# Patient Record
Sex: MALE | Race: WHITE | NOT HISPANIC OR LATINO | URBAN - METROPOLITAN AREA
[De-identification: names, ages, dates, MRNs, and addresses within clinical notes are randomized per-mention and may not be internally consistent; named-entity substitution may affect disease eponyms.]

---

## 2020-04-16 ENCOUNTER — TELEPHONE (OUTPATIENT)
Dept: TRANSPLANT | Facility: CLINIC | Age: 25
End: 2020-04-16

## 2020-04-16 DIAGNOSIS — Z00.5 TRANSPLANT DONOR EVALUATION: Primary | ICD-10-CM

## 2020-04-16 NOTE — TELEPHONE ENCOUNTER
NDD. Is abo O. Takes Anastrozole for low Testosterone.Per Dr Galdamez it's OK to cont testing. Will send info/forms. Once able,will sched labs at local clinic.

## 2020-04-16 NOTE — TELEPHONE ENCOUNTER
"[facility] BREEZE  [id number]      LIVING KIDNEY DONOR EVALUATION  Donor First Name Ari Donor MRN    Donor Last Name Leona Completed 4/10/2020 7:37 PM    1995 Record ID [id number]   BREEZE Screen PASSED     Intended Recipient  Recipient First Name ALTRUISTIC Recipient MRN    Recipient Last Name ALTRUISTIC Relationship N/A   Recipient   Recipient Diagnosis    Recipient's ABO      Donor Information  Age 24 Gender Male   Ht 188 cm (6' 2'') Race    Wt 81.6 kg (180 lbs) Ethnicity Not /   BMI 23.10 kg/m  Preferred Language English      Required No     Blood Type O   Demographics  Home Address [address] [place] [address] # [phone]   [facility] Type [facility] Alternate #    [facility] Code [id number] Type    Country [place] Preferred Contact day Mon, Fri, Thur, Wed, Tue   Email [email or web address] Preferred Contact time 09:00 AM-11:00 AM   &&   Donor's Medical Information  Medical History Panic d/o NOS   other (\"Low testosterone for age\")   other (\"Period of anxiety attacks which have resolved soon after starting the medication (~6 months ago)\") Medications Anastrozole   Escitalopram   Surgical History None Reported Allergies NKDA   Social History EtOH: Occasional (1-2 drinks/week)   Illicit Drug Use: Denies   Tobacco: Denies Self-Reported Functional Status \"I am able to participate in strenuous sports such as swimming, singles tennis, football, basketball, or skiing\"   Family Medical History Cancer (denies)   Diabetes (denies)   Heart Disease (denies)   Hypertension (denies)   Kidney Disease (denies)   Kidney Stones (denies) Exercise Frequency Exercise (>3 times per week)   Review of Organ Systems  Review of Systems Airway or Lungs: No   Blood Disorder: No   Cancer: No   Diabetes,Thyroid,Adrenal,Endocrine Disorder: No   Digestive or Liver: No   Heart or Circulatory System: No   Immune Diseases: No   Kidneys and Bladder: No   Male Health: No   Muscles,Bones,Joints: " No   Neuro: No   Psych: Yes   &&   Donor's Social Information  Marital Status Single Living Accommodation Lives in rented accommodation   Level of Education College or baccalaureate degree complete Living Arrangement With no relatives, residential arrangement   Employment Status Full Time Concerns: health and life insurance No   Employer [employer] Concerns: job security and lost income No   Occupation      Medical Insurance Status Has medical insurance     High Risk Behavior  High Risk Behaviors Blood transfusion < 12 months. (NO)   Commercial sex < 12 months. (NO)   Illicit IV drug use < 5yrs. (NO)   Male:male sexual contact < 5yrs. (NO)   Other high risk sexual contact < 12 months. (NO)   Reason for Donation  Referral Self Researched Reason for Donation I have two functional kidneys. I don't need both. A lot of times in life we can't help people. However, this is one way through which I can.   Permission to Disclose Inquiry Yes Patient Comments None at the moment. Thanks!   Donor Motivation Level Ready to start evaluation with reservations     PCP Contact  PCP Name Henok Alarcon   PCP [facility]   PCP Phone    Emergency Contact  First Name Adwoa First Name Izabela   Last Name Leona Last Name Leona   Phone # [phone] Phone # [phone]   Phone Type Mobile Phone Type Mobile   Relationship Mother Relationship Sister   Office Use  Reviewed By    Reviewed 4/16/2020 11:33 AM   Admin Folder Archive   Comments    Lost for Followup    Extended Comments    BREEZE ID [email or web address]:[id number] survey status completed   Activity History  Call  Task    Due Date 4/16/2020   Last Modified Date/Time 4/16/2020 11:10 AM   Comments    Call  Task    Due Date 4/14/2020   Last Modified Date/Time 4/14/2020 10:17 AM   Comments

## 2020-08-20 ENCOUNTER — ALLIED HEALTH/NURSE VISIT (OUTPATIENT)
Dept: TRANSPLANT | Facility: CLINIC | Age: 25
End: 2020-08-20
Attending: INTERNAL MEDICINE

## 2020-08-20 VITALS
HEART RATE: 67 BPM | OXYGEN SATURATION: 97 % | WEIGHT: 177.4 LBS | SYSTOLIC BLOOD PRESSURE: 123 MMHG | TEMPERATURE: 97.7 F | DIASTOLIC BLOOD PRESSURE: 80 MMHG | BODY MASS INDEX: 23.51 KG/M2 | HEIGHT: 73 IN

## 2020-08-20 DIAGNOSIS — Z00.5 TRANSPLANT DONOR EVALUATION: Primary | ICD-10-CM

## 2020-08-20 DIAGNOSIS — Z00.5 TRANSPLANT DONOR EVALUATION: ICD-10-CM

## 2020-08-20 LAB
ABO + RH BLD: NORMAL
ABO + RH BLD: NORMAL
ALBUMIN UR-MCNC: NEGATIVE MG/DL
APPEARANCE UR: CLEAR
BILIRUB UR QL STRIP: NEGATIVE
COLOR UR AUTO: YELLOW
CREAT SERPL-MCNC: 1.05 MG/DL (ref 0.66–1.25)
CREAT UR-MCNC: 156 MG/DL
GFR SERPL CREATININE-BSD FRML MDRD: >90 ML/MIN/{1.73_M2}
GLUCOSE SERPL-MCNC: 91 MG/DL (ref 70–99)
GLUCOSE UR STRIP-MCNC: NEGATIVE MG/DL
HGB BLD-MCNC: 14.9 G/DL (ref 13.3–17.7)
HGB UR QL STRIP: NEGATIVE
KETONES UR STRIP-MCNC: NEGATIVE MG/DL
LEUKOCYTE ESTERASE UR QL STRIP: NEGATIVE
MUCOUS THREADS #/AREA URNS LPF: PRESENT /LPF
NITRATE UR QL: NEGATIVE
PH UR STRIP: 6 PH (ref 5–7)
PROT UR-MCNC: 0.1 G/L
PROT/CREAT 24H UR: 0.06 G/G CR (ref 0–0.2)
RBC #/AREA URNS AUTO: <1 /HPF (ref 0–2)
SOURCE: ABNORMAL
SP GR UR STRIP: 1.02 (ref 1–1.03)
SPECIMEN EXP DATE BLD: NORMAL
UROBILINOGEN UR STRIP-MCNC: 0 MG/DL (ref 0–2)
WBC #/AREA URNS AUTO: <1 /HPF (ref 0–5)

## 2020-08-20 PROCEDURE — 81001 URINALYSIS AUTO W/SCOPE: CPT | Performed by: TRANSPLANT SURGERY

## 2020-08-20 PROCEDURE — 82947 ASSAY GLUCOSE BLOOD QUANT: CPT | Performed by: TRANSPLANT SURGERY

## 2020-08-20 PROCEDURE — 36415 COLL VENOUS BLD VENIPUNCTURE: CPT | Performed by: TRANSPLANT SURGERY

## 2020-08-20 PROCEDURE — 85018 HEMOGLOBIN: CPT | Performed by: TRANSPLANT SURGERY

## 2020-08-20 PROCEDURE — 84156 ASSAY OF PROTEIN URINE: CPT | Performed by: TRANSPLANT SURGERY

## 2020-08-20 PROCEDURE — 86900 BLOOD TYPING SEROLOGIC ABO: CPT | Performed by: TRANSPLANT SURGERY

## 2020-08-20 PROCEDURE — 82565 ASSAY OF CREATININE: CPT | Performed by: TRANSPLANT SURGERY

## 2020-08-20 ASSESSMENT — PAIN SCALES - GENERAL: PAINLEVEL: NO PAIN (0)

## 2020-08-20 ASSESSMENT — MIFFLIN-ST. JEOR: SCORE: 1857.52

## 2020-08-20 NOTE — NURSING NOTE
"Chief Complaint   Patient presents with     Allied Health Visit     3 BP's     Vital signs:  Temp: 97.7  F (36.5  C)   BP: 123/80 Pulse: 67     SpO2: 97 %     Height: 186.1 cm (6' 1.25\") Weight: 80.5 kg (177 lb 6.4 oz)  Estimated body mass index is 23.25 kg/m  as calculated from the following:    Height as of this encounter: 1.861 m (6' 1.25\").    Weight as of this encounter: 80.5 kg (177 lb 6.4 oz).        Jennifer Carr, ALISSA    "

## 2020-08-21 ENCOUNTER — TELEPHONE (OUTPATIENT)
Dept: TRANSPLANT | Facility: CLINIC | Age: 25
End: 2020-08-21

## 2020-08-21 DIAGNOSIS — Z00.5 TRANSPLANT DONOR EVALUATION: Primary | ICD-10-CM

## 2020-09-03 ENCOUNTER — TELEPHONE (OUTPATIENT)
Dept: TRANSPLANT | Facility: CLINIC | Age: 25
End: 2020-09-03

## 2020-09-03 NOTE — TELEPHONE ENCOUNTER
I left a voice mail message for Ari today asking if I could complete his kidney donor psychosocial evaluation today rather than tomorrow.  I will await a return call from him.    MARIA ELENA Ramsey, [facility]  Clinical  and Independent Donor Advocate  [facility]  Pager:  [phone]  Direct:  [phone]

## 2020-09-03 NOTE — PROGRESS NOTES
Psychosocial Evaluation  Living Organ Donation per OPTN Policy 14.1.A  Organ Type: kidney, non-directed donor  Presenting Information:  Mr. Ari Delgadillo presents to the [facility] to complete a psychosocial evaluation since she is interested in becoming a kidney donor for someone in need.    PERSONAL BACKGROUND:  Current Living Situation: Ari lives in a home that he rents in [place], [place].and lives with a roommate who is a close friend.    Education/Employment/Financial Situation: Ari completed high school in [place], [place].  He graduated from [employer] 1 year ago.  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  He states that he loves this job.  He has paid time off.  He denies any concerns about financial burden as a result of kidney donation.    Health Insurance Status: Ari states that he has health care insurance through his parents and can be on that until he is 28 years old.    Family History: Ari is  single, never , and has no children.  His parents live in [place], [place].  His sister and brother in law live in [place].   Has another sister who lives in [place], [place].      General Health: Ari reports that he is in excellent health.  He like to lift weights.    Mental Health: 5 mg Lexapro to manage anxiety.    Alcohol and Drug Use/Abuse/Dependency: Denies any past history of abuse or dependency on alcohol or illicit drugs. Denies any current use of street drugs, including marijuana.  Denies any past history of negative consequences of use of alcohol or drugs such as a DUI, relationship problems, or problems with work or home life. Ari reports that he consume approximately 3 servings of alcohol per week.      Cigarette Use: Ari smokes an occasional cigar.  He does not smoke cigarettes.      Legal: no legal issues were identified.    Coping with major surgery/associated stress: Ari likes to read and stay  active as ways to cope with stress.     Support System: Ari reports that his support system includes his roommate, his parents, and his two sisters.  His mother is very supportive of him being a kidney donor.  His father is not totally on board, but will support him nonetheless.      DONOR SPECIFIC INFORMATION:  Relationship to Recipient: not applicable.  Mr. Delgadillo wants to give his kidney to someone in need.    Decision Process/Motivation to Donate: Ari states that he feels strongly that helping others in ways that you can is a personal value.  He states that he has two functional kidneys and does not need both.  He says that a lot of times in life we can't help people.  However, this is one way that he can help.  Ari has a history of volunteering in high school for his [employer] community.  He says that this desire to donate a kidney is in keeping with his personal ethics.    PREPARATION FOR DONATION, RECOVERY, AND POTENTIAL SHORT-LONG-TERM OUTCOMES:  Understanding of the Living Donation Process:   We discussed the role of the donor  and Independent Donor Advocate.  Short and long term medical and psychosocial risks to both, donor and recipient were reviewed and he voices his understanding of these risks.  High risk behaviors as defined by [place] Public Health Services (PHS) 2013 that have potential to increase risk of disease transmission were reviewed and he denies any high risk behaviors. Post surgical restrictions (2 weeks no driving, 6 weeks no lifting over 10 lbs) were reviewed and he appears capable of adhering to the post surgical requirements. The need for a caregiver was discussed and he has his parents and roommate to provide care to him post surgery .  The risk of poor psychosocial outcome including problems with body image, post-surgery depression or anxiety, or feelings of emotional distress or bereavement if recipient experiences any recurrent disease, poor outcome or death was reviewed.   Additionally, potential financial implications, including the risk of having difficulty obtaining health care insurance, life insurance, disability insurance, or long term care insurance were reviewed, as were available donor grants to assist with donor related expenses.      We also discussed some unique issues that arise with paired kidney donation, which include the uncertainty of the timing and the importance of having a employment situation and support system that is able to provide sustained support and flexibility.    Ari appears capable of understanding this information and making an informed medical decision.    Impressions/Recommendations:   Mr. Ari Delgadillo  is highly motivated to donate kidney  to someone in need.  His decision to donate is free of inducement, coercion, or other undue pressure.   His housing, finances and employment are stable.  No current/active mental health or chemical abuse issues were identified.  The need for a caregiver was reviewed and he is able to identify a plan to meet his post operative care needs.  Mr. Delgadillo appears capable of making an informed medical decision.  No psychosocial contraindications to living organ donation were identified and  I support Mr. Delgadillo s desire to donate a kidney to someone in need.       Contact Information:   MARIA ELENA Ramsey, [facility]  Clinical  and Independent Donor Advocate  [facility]  Pager:  [phone]  Direct:  [phone]    Time Spent: 60 minutes      Living Kidney Donor Consent per OPTN Policy 14.3.A for Independent Living Donor Advocate (JANETH)    Written assurance has been obtained from the potential donor that he/she:   Is willing to donate  Is free from inducement and coercion  Has been informed that the he/she may decline to donate at any time  Has been informed that transplant centers must:   A) Offer donors an opportunity to discontinue the donor consent or evaluation  process in a way that is protected and confidential  B) Provide an independent living donor advocate (JANETH) to assist the potential donor during this process    The following was presented to the potential donor in a language in which the potential donor is able to engage in meaningful dialogue:   Education and instruction about all phases of the living donation process including:   Consent  Medical and psychosocial evaluation  Information about the surgical procedure  Pre and post operative care  Benefits of post operative follow up  Disclosure that the [facility] will take all reasonable precautions to provide confidentiality for the donor/recipient  Disclosure that it is a federal crime for any person to knowingly acquire, obtain or otherwise transfer any human organ for valuable consideration  Disclosure that the [facility] must provide an independent living donor advocate (JANETH)  Disclosure that health information obtained during the evaluation is subject to the same regulations as all records and could reveal conditions that must be reported to local, state, or federal public health authorities  Disclosure that the [facility] is required to report living donor follow up information at 6 months, 1 year, and 2 years, and that the potential donor must commit to post operative follow up testing coordinated by the [facility]    Disclosure has been provided that these risks may be transient or permanent & include but are not limited to:  Potential psychosocial risks:  Problems with body image  Post-surgery depression or anxiety  Feelings of emotional distress or bereavement if recipient experiences any recurrent disease or in the event of the recipient s death  Impact of donation on the donor s lifestyle    Potential financial impacts:  Personal expenses of travel, housing, , lost wages related to donation might not be reimbursed. However, resources may be available to defray  some donation-related expenses   Need for life-long follow up at the donor s expense  Loss of employment or income  Negative impact on the ability to obtain future employment  Negative impact on the ability to obtain, maintain, or afford health, disability, and life insurance  Future health problems experienced by living donors following donation may not be covered by the recipient s insurance    Contact Information:  MARIA ELENA Ramsey, [facility]  Clinical  and Independent Donor Advocate  [facility]  Pager:  [phone]  Direct:  [phone]    Time Spent: 60 minutes

## 2020-09-04 ENCOUNTER — VIRTUAL VISIT (OUTPATIENT)
Dept: TRANSPLANT | Facility: CLINIC | Age: 25
End: 2020-09-04
Attending: TRANSPLANT SURGERY

## 2020-09-04 ENCOUNTER — VIRTUAL VISIT (OUTPATIENT)
Dept: NEPHROLOGY | Facility: CLINIC | Age: 25
End: 2020-09-04
Attending: TRANSPLANT SURGERY

## 2020-09-04 DIAGNOSIS — F41.9 ANXIETY: ICD-10-CM

## 2020-09-04 DIAGNOSIS — Z00.5 EXAMINATION OF POTENTIAL DONOR OF ORGAN AND TISSUE: Primary | ICD-10-CM

## 2020-09-04 DIAGNOSIS — Z00.5 TRANSPLANT DONOR EVALUATION: Primary | ICD-10-CM

## 2020-09-04 DIAGNOSIS — Z00.5 TRANSPLANT DONOR EVALUATION: ICD-10-CM

## 2020-09-04 RX ORDER — ESCITALOPRAM OXALATE 5 MG/1
TABLET ORAL
COMMUNITY
Start: 2019-10-01 | End: 2020-12-01

## 2020-09-04 RX ORDER — LORATADINE 10 MG/1
TABLET ORAL DAILY PRN
COMMUNITY

## 2020-09-04 NOTE — PROGRESS NOTES
"Ari Delgadillo is a 24 year old male who is being evaluated via a billable telephone visit.      The patient has been notified of following:     \"This telephone visit will be conducted via a call between you and your physician/provider. We have found that certain health care needs can be provided without the need for a physical exam.  This service lets us provide the care you need with a short phone conversation.  If a prescription is necessary we can send it directly to your pharmacy.  If lab work is needed we can place an order for that and you can then stop by our lab to have the test done at a later time.    Telephone visits are billed at different rates depending on your insurance coverage. During this emergency period, for some insurers they may be billed the same as an in-person visit.  Please reach out to your insurance provider with any questions.    If during the course of the call the physician/provider feels a telephone visit is not appropriate, you will not be charged for this service.\"    Patient has given verbal consent for Telephone visit? yes    Phone call duration: 15 minutes    Outpatient MNT: Kidney Donor Evaluation    Current BMI: 23.3 (HT 73.25 in,  lbs/81 kg)- data from 8/20   BMI is within recommendation of <30 for kidney donation     Time Spent: 15 minutes  Visit Type: Initial   Referring Physician: Regan   Pt accompanied by: self    Nutrition Assessment  Vitamins, Supplements, Pertinent Meds: none   Herbal Medicines/Supplements: none     Diet Recall  Breakfast May have some fruit or waits until lunch    Lunch Skinny Bravo, Karla, or chicken/kale/quinoa, s/w, or PB english muffin at home    Dinner Similar to L    Snacks Chips, fruit, cereal    Beverages Coffee, water, la croix    Alcohol 3-4 drinks/week    Dining out 3x/week      Physical Activity  Lifts weights and plays tennis 6x/week      Anthropometrics  Height:   73.25 in   BMI:    23.3    Weight Status:Normal BMI   Weight:  177 " lbs (8/20)            IBW (lb): 186  % IBW: 95    Wt Hx: Pt has intentionally gained 5-10 lbs recently.     Adj/dosing BW: 177 lbs/81 kg       Labs  No results for input(s): CHOL, HDL, LDL, TRIG, CHOLHDLRATIO in the last 00732 hours.    FBG = 91 (8/20)  A1C = no recent level on file   BP = 123/80    Prediction of Incident Diabetes Mellitus in Middle-aged Adults: The [place] Offspring Study  Mauro Petersen MD; James B. Meigs, MD, MPH; Dolores Austin, PhD; Deepa Beasley MD, MPH; Forest Campos MD; Lance Gonzalez Sr,   PhD  Pt's estimated risk for T2DM (per Table 6 above)  Pt received points for the following criteria: unable to calculate d/t incomplete lab workup     Estimated Nutrition Needs  Energy  [id number]    (25-30 kcal/kg for maintenance)     Protein  65-81    (0.8-1 g/kg for maintenance)           Fluid  1 ml/kcal or per MD     Nutrition Diagnosis  Food and nutrition related knowledge deficit r/t pre transplant donor eval pt AEB pt verbalized not hearing pre/post transplant diet guidelines.    Nutrition Intervention  Nutrition education provided:  Reviewed overall healthy diet guidelines for pre and post kidney donation. Discussed maintenance of a healthy weight and Na+ intake <3000 mg/day (<2000 mg/day if HTN).    Avoid the following post op d/t unknown effects on the organs:  - Herbal, Chinese, holistic, chiropractic, natural, alternative medicines and supplements  - Detoxes and cleanses  - Weight loss pills  - Protein powders or other products with extracts or herbs (ie green tea extract)    Patient Understanding: Pt verbalized understanding of education provided.  Expected Compliance: Good  Follow-Up Plans: PRN     Nutrition Goals  Pt to verbalize understanding of education provided     Provided pt with contact info.   Shania Engle, RD, LD, CCTD  Pgr [phone]

## 2020-09-04 NOTE — LETTER
9/4/2020         RE: Ari Delgadillo  [address] [place] [address]        Dear Colleague,    Thank you for referring your patient, Ari Delgadillo, to the [facility] SOLID ORGAN TRANSPLANT. Please see a copy of my visit note below.    Psychosocial Evaluation  Living Organ Donation per OPTN Policy 14.1.A  Organ Type: kidney, non-directed donor  Presenting Information:  Mr. Ari Delgadillo presents to the [facility] to complete a psychosocial evaluation since she is interested in becoming a kidney donor for someone in need.    PERSONAL BACKGROUND:  Current Living Situation: Ari lives in a home that he rents in [place], [place].and lives with a roommate who is a close friend.    Education/Employment/Financial Situation: Ari completed high school in [place], [place].  He graduated from [employer] 1 year ago.  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  He states that he loves this job.  He has paid time off.  He denies any concerns about financial burden as a result of kidney donation.    Health Insurance Status: Ari states that he has health care insurance through his parents and can be on that until he is 28 years old.    Family History: Ari is  single, never , and has no children.  His parents live in [place], [place].  His sister and brother in law live in [place].   Has another sister who lives in [place], [place].      General Health: Ari reports that he is in excellent health.  He like to lift weights.    Mental Health: 5 mg Lexapro to manage anxiety.    Alcohol and Drug Use/Abuse/Dependency: Denies any past history of abuse or dependency on alcohol or illicit drugs. Denies any current use of street drugs, including marijuana.  Denies any past history of negative consequences of use of alcohol or drugs such as a DUI, relationship problems, or problems with work or home life. Ari reports that he consume approximately 3  servings of alcohol per week.      Cigarette Use: Ari smokes an occasional cigar.  He does not smoke cigarettes.      Legal: no legal issues were identified.    Coping with major surgery/associated stress: Ari likes to read and stay active as ways to cope with stress.     Support System: Ari reports that his support system includes his roommate, his parents, and his two sisters.  His mother is very supportive of him being a kidney donor.  His father is not totally on board, but will support him nonetheless.      DONOR SPECIFIC INFORMATION:  Relationship to Recipient: not applicable.  Mr. Delgadillo wants to give his kidney to someone in need.    Decision Process/Motivation to Donate: Ari states that he feels strongly that helping others in ways that you can is a personal value.  He states that he has two functional kidneys and does not need both.  He says that a lot of times in life we can't help people.  However, this is one way that he can help.  Ari has a history of volunteering in high school for his [employer] community.  He says that this desire to donate a kidney is in keeping with his personal ethics.    PREPARATION FOR DONATION, RECOVERY, AND POTENTIAL SHORT-LONG-TERM OUTCOMES:  Understanding of the Living Donation Process:   We discussed the role of the donor  and Independent Donor Advocate.  Short and long term medical and psychosocial risks to both, donor and recipient were reviewed and he voices his understanding of these risks.  High risk behaviors as defined by [place] Public Health Services (PHS) 2013 that have potential to increase risk of disease transmission were reviewed and he denies any high risk behaviors. Post surgical restrictions (2 weeks no driving, 6 weeks no lifting over 10 lbs) were reviewed and he appears capable of adhering to the post surgical requirements. The need for a caregiver was discussed and he has his parents and roommate to provide care to him post surgery .  The risk  of poor psychosocial outcome including problems with body image, post-surgery depression or anxiety, or feelings of emotional distress or bereavement if recipient experiences any recurrent disease, poor outcome or death was reviewed.  Additionally, potential financial implications, including the risk of having difficulty obtaining health care insurance, life insurance, disability insurance, or long term care insurance were reviewed, as were available donor grants to assist with donor related expenses.      We also discussed some unique issues that arise with paired kidney donation, which include the uncertainty of the timing and the importance of having a employment situation and support system that is able to provide sustained support and flexibility.    Ari appears capable of understanding this information and making an informed medical decision.    Impressions/Recommendations:   Mr. Ari Delgadillo  is highly motivated to donate kidney  to someone in need.  His decision to donate is free of inducement, coercion, or other undue pressure.   His housing, finances and employment are stable.  No current/active mental health or chemical abuse issues were identified.  The need for a caregiver was reviewed and he is able to identify a plan to meet his post operative care needs.  Mr. Delgadillo appears capable of making an informed medical decision.  No psychosocial contraindications to living organ donation were identified and  I support Mr. Delgadillo s desire to donate a kidney to someone in need.       Contact Information:   MARIA ELENA Ramsey, [facility]  Clinical  and Independent Donor Advocate  [facility]  Pager:  [phone]  Direct:  [phone]    Time Spent: 60 minutes      Living Kidney Donor Consent per OPTN Policy 14.3.A for Independent Living Donor Advocate (JANETH)    Written assurance has been obtained from the potential donor that he/she:   Is willing to  donate  Is free from inducement and coercion  Has been informed that the he/she may decline to donate at any time  Has been informed that transplant centers must:   A) Offer donors an opportunity to discontinue the donor consent or evaluation process in a way that is protected and confidential  B) Provide an independent living donor advocate (JANETH) to assist the potential donor during this process    The following was presented to the potential donor in a language in which the potential donor is able to engage in meaningful dialogue:   Education and instruction about all phases of the living donation process including:   Consent  Medical and psychosocial evaluation  Information about the surgical procedure  Pre and post operative care  Benefits of post operative follow up  Disclosure that the [facility] will take all reasonable precautions to provide confidentiality for the donor/recipient  Disclosure that it is a federal crime for any person to knowingly acquire, obtain or otherwise transfer any human organ for valuable consideration  Disclosure that the [facility] must provide an independent living donor advocate (JANETH)  Disclosure that health information obtained during the evaluation is subject to the same regulations as all records and could reveal conditions that must be reported to local, state, or federal public health authorities  Disclosure that the [facility] is required to report living donor follow up information at 6 months, 1 year, and 2 years, and that the potential donor must commit to post operative follow up testing coordinated by the [facility]    Disclosure has been provided that these risks may be transient or permanent & include but are not limited to:  Potential psychosocial risks:  Problems with body image  Post-surgery depression or anxiety  Feelings of emotional distress or bereavement if recipient experiences any recurrent disease or in the event of the  recipient s death  Impact of donation on the donor s lifestyle    Potential financial impacts:  Personal expenses of travel, housing, , lost wages related to donation might not be reimbursed. However, resources may be available to defray some donation-related expenses   Need for life-long follow up at the donor s expense  Loss of employment or income  Negative impact on the ability to obtain future employment  Negative impact on the ability to obtain, maintain, or afford health, disability, and life insurance  Future health problems experienced by living donors following donation may not be covered by the recipient s insurance    Contact Information:  MARIA ELENA Ramsey, [facility]  Clinical  and Independent Donor Advocate  [facility]  Pager:  [phone]  Direct:  [phone]    Time Spent: 60 minutes      Again, thank you for allowing me to participate in the care of your patient.        Sincerely,        KELSY Garcia

## 2020-09-04 NOTE — LETTER
"    9/4/2020         RE: Ari Delgadillo  [address] [place] [address]        Dear Colleague,    Thank you for referring your patient, Ari Delgadillo, to the [facility] SOLID ORGAN TRANSPLANT. Please see a copy of my visit note below.    Ari Delgadillo is a 24 year old male who is being evaluated via a billable telephone visit.      The patient has been notified of following:     \"This telephone visit will be conducted via a call between you and your physician/provider. We have found that certain health care needs can be provided without the need for a physical exam.  This service lets us provide the care you need with a short phone conversation.  If a prescription is necessary we can send it directly to your pharmacy.  If lab work is needed we can place an order for that and you can then stop by our lab to have the test done at a later time.    Telephone visits are billed at different rates depending on your insurance coverage. During this emergency period, for some insurers they may be billed the same as an in-person visit.  Please reach out to your insurance provider with any questions.    If during the course of the call the physician/provider feels a telephone visit is not appropriate, you will not be charged for this service.\"    Patient has given verbal consent for Telephone visit? yes    Phone call duration: 15 minutes    Outpatient MNT: Kidney Donor Evaluation    Current BMI: 23.3 (HT 73.25 in,  lbs/81 kg)- data from 8/20   BMI is within recommendation of <30 for kidney donation     Time Spent: 15 minutes  Visit Type: Initial   Referring Physician: Regan   Pt accompanied by: self    Nutrition Assessment  Vitamins, Supplements, Pertinent Meds: none   Herbal Medicines/Supplements: none     Diet Recall  Breakfast May have some fruit or waits until lunch    Lunch Skinny Bravo, Chipotle, or chicken/kale/quinoa, s/w, or PB english muffin at home    Dinner Similar to L    Snacks Chips, fruit, cereal    Beverages " Coffee, water, la croix    Alcohol 3-4 drinks/week    Dining out 3x/week      Physical Activity  Lifts weights and plays tennis 6x/week      Anthropometrics  Height:   73.25 in   BMI:    23.3    Weight Status:Normal BMI   Weight:  177 lbs (8/20)            IBW (lb): 186  % IBW: 95    Wt Hx: Pt has intentionally gained 5-10 lbs recently.     Adj/dosing BW: 177 lbs/81 kg       Labs  No results for input(s): CHOL, HDL, LDL, TRIG, CHOLHDLRATIO in the last 70961 hours.    FBG = 91 (8/20)  A1C = no recent level on file   BP = 123/80    Prediction of Incident Diabetes Mellitus in Middle-aged Adults: The [place] Offspring Study  Mauro Petersen MD; James B. Meigs, MD, MPH; Dolores Austin, PhD; Deepa Beasley MD, MPH; Forest Campos MD; Lance Gonzalez Sr,   PhD  Pt's estimated risk for T2DM (per Table 6 above)  Pt received points for the following criteria: unable to calculate d/t incomplete lab workup     Estimated Nutrition Needs  Energy  6069-3804    (25-30 kcal/kg for maintenance)     Protein  65-81    (0.8-1 g/kg for maintenance)           Fluid  1 ml/kcal or per MD     Nutrition Diagnosis  Food and nutrition related knowledge deficit r/t pre transplant donor eval pt AEB pt verbalized not hearing pre/post transplant diet guidelines.    Nutrition Intervention  Nutrition education provided:  Reviewed overall healthy diet guidelines for pre and post kidney donation. Discussed maintenance of a healthy weight and Na+ intake <3000 mg/day (<2000 mg/day if HTN).    Avoid the following post op d/t unknown effects on the organs:  - Herbal, Chinese, holistic, chiropractic, natural, alternative medicines and supplements  - Detoxes and cleanses  - Weight loss pills  - Protein powders or other products with extracts or herbs (ie green tea extract)    Patient Understanding: Pt verbalized understanding of education provided.  Expected Compliance: Good  Follow-Up Plans: PRN     Nutrition Goals  Pt to verbalize  understanding of education provided     Provided pt with contact info.   Shania Engle RD, LD, CCTD  Pgr [phone]                                          Again, thank you for allowing me to participate in the care of your patient.        Sincerely,        Shania Engle RD

## 2020-09-04 NOTE — LETTER
Date:September 9, 2020      Patient was self referred, no letter generated. Do not send.        [facility] Health Information

## 2020-09-04 NOTE — PROGRESS NOTES
I called today to provide education on living kidney donation.  The format for our conversation was talking on the phone and looking simultaneously at the slides for the Living Kidney Donor Informed Consent and SRTR form.  The patient has offered to be a nondirect donor.      Discussed surgery hospitalization and recovery.    Knows when and how to obtain evaluation results and the process for scheduling surgery.  Has received and reviewed the donor education materials including the Informed Consent for Living Kidney Donation.  The patient will sign the consent for donor evaluation and send it back to the Transplant Office.  Reviewed SRTR Data sheet.  Donor was Provided Living Donor Collective (LDC) Materials? Yes  Donor has agreed to be contacted by SRTR for follow-up LDC Questions? Yes  Reviewed that our center is participating in the Living Donor Collective to study the long-term outcomes of living organ donation.  Patient was born in the [place]  The patient states is a Non-Smoker.  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  He stated workplace is supportive as long as he plans things around the projects.  Timing for his donation would be after hunting season.  Late November/December to the Spring.      Nondirected donor Program:  I reviewed the possible ways that a NonDirect Donor can give a kidney.  One being running a matchrun after setting a date and the kidney would be allocated to the person at the top of the list.  I also reviewed that details pertaining to the Paired Exchange program National Kidney Registry.                         Patient has been sent consent forms for the KPD program via [employer].  [facility] Logistics:    I reviewed that in KPD you are not able to choose your match, but they may decline a match.  I showed possible scenarios that can happen when a chain is put together where there is a possibility of helping more than one  candidate receive a kidney.     I reviewed that the timeline for listing and finding a match is unknown, there may be significant waiting time to find a matched based on blood type.     I described the timing of a typical KPD event, where the donor cases go early in the morning and then the kidneys are shipped to the recipient site.     That the donor s kidney could be lost in transport, and other potentially negative consequences related to shipping a kidney.  Insurance:   I also reviewed that in D program the insurance of the matched recipient will be paying the donation costs.  I also reviewed:              1. The possibility that the matched candidate s insurance might not cover travel costs if the paired donor travels to the matched recipient transplant hospital.              2. The possibility of the paired donor s name appearing on the matched candidate s insurance estimation of benefits.    [facility] Blood Draw and Testing requirements:   I reviewed that frequent lab draws are necessary in the D program.  I explained that [facility] will send a kit when we initiate listing so that blood can be frozen to be used for exploratory crossmatches.  I stated that there is also additional testing needed when a match offer is given for confirmatory compatibility testing and infectious disease testing.  I reviewed that the donor's evaluation is good for one year.  If we are waiting after one year, then labs will need to be done to update the evaluation.    Correspondence with Matched Recipient:  I reviewed that it is okay to send a card to the recipient, that we ask that No identifiers be on the correspondence.  I stated that if all parties are willing, the Transplant office can facilitate meetings between matched donors and recipients.  I reviewed that the patient has the right to withdraw from participation in the KPD program at any time, for any reason.     Living Kidney Donor Informed Consent per OPTN Policy for Transplant  Coordinators:  I provided to the patient education and instruction about all phases of the living donation process that included:    A copy of the entire consent with SRTR outcomes.    Information regarding the financial impact of donation Results of medical and psychosocial evaluations.    Information stating that the [facility] may refuse you as a donor,  however, you could be evaluated by another transplant program with different selection criteria.    Instructions for pre- and post-operative care that state as a potential donor, you must commit to postoperative follow-up testing at 6 weeks, 6 months, 1 year, and 2 years.    Information was presented in a language in which the patient was able to engage in meaningful dialogue by use of an  if needed.    It is a federal crime for any person to knowingly acquire, obtain or otherwise transfer any human organ for valuable consideration.    [facility] takes all reasonable precautions to provide confidentiality for you, and the recipient. However, all health information obtained during the evaluation is subject to the same regulations as all records and could reveal conditions that must be reported to local, state, or federal public health authorities.    Disclosure of alternative procedures or courses of treatment for the recipient, including  donor transplant.    A  donor kidney may become available for the recipient before your donor evaluation is complete or transplant occurs.    Recipients are determined to be transplant candidates based on specific guidelines and clinical  judgement.    The recipient may have risk factors for increased morbidity or mortality that are above local or national averages do not prohibit transplantation and are not disclosed to you.    Rochelle Long RN  Living Donor Coordinator  2020 3:19 PM

## 2020-09-04 NOTE — PROGRESS NOTES
"Ari Delgadillo is a 24 year old male who is being evaluated via a billable video visit.      The patient has been notified of following:     \"This video visit will be conducted via a call between you and your physician/provider. We have found that certain health care needs can be provided without the need for an in-person physical exam.  This service lets us provide the care you need with a video conversation.  If a prescription is necessary we can send it directly to your pharmacy.  If lab work is needed we can place an order for that and you can then stop by our lab to have the test done at a later time.    Video visits are billed at different rates depending on your insurance coverage.  Please reach out to your insurance provider with any questions.    If during the course of the call the physician/provider feels a video visit is not appropriate, you will not be charged for this service.\"    Patient has given verbal consent for Video visit? Yes  How would you like to obtain your AVS? MyChart  If you are dropped from the video visit, the video invite should be resent to: Text to cell phone: [phone]  Will anyone else be joining your video visit? No        Video-Visit Details    Type of service:  Video Visit    Video Start Time: 9:35am  Video End Time: 10:05am    Originating Location (pt. Location): home    Distant Location (provider location):   [employer] [facility] [employer] [facility]     Platform used for Video Visit: [employer]    RE: Ari Delgadillo  [facility] #[id number]             I saw your patient, Ari Delgadillo, in consultation in our pretransplant clinic.  He presented via video visit for evaluation as a possible kidney donor. We discussed:    (1) The risks of donation--including mortality (0.03% risk) and morbidity (approximately 1% risk of major morbidity).  We discussed the major reported causes of death after kidney donation (bleeding, infection, pulmonary embolism).  We discussed the potential complications, including:  " bleeding requiring reoperation, infection requiring reoperation, pneumonia, bowel obstruction, infection at the site of the incision, hernia at the site of the incision, deep vein thrombosis, deep vein thrombosis with associated pulmonary embolism, and urinary tract infection.  I told him I could not possibly name all of the risks, but that he was at risk for any complications that could occur in any operation (and that a local library would have books/resources that could provide more detail).       (2) We also discussed the long-term risks of living with one kidney.  We discussed the rare diseases that might affect only one kidney.  We talked about the new publications suggesting slightly increased long-term risk of ESRD after donor nephrectomy.  For people his age, there is really no data to show what the outcome will be at 50-60 years.  This is an important consideration.  We discussed the rare diseases that might affect having only one kidney.    (3) The hospital stay and the fact that if all goes well, discharge would occur within two to three days after donor nephrectomy.  We also discussed the fact that there would be no diet or fluid restrictions (again if all went well), and that the only new medication that he would be on would be pain medication.  We discussed the fact that most patients are on Tylenol or something similar within five to six days of surgery.      (4) The recovery time after surgery and the restrictions that are necessary: a) no driving for a couple of weeks (or until he felt that his/her reaction would be adequate if he had to slam on the brakes; and b) no lifting over 10 pounds or any exercise that would stretch his abdominal muscles for six weeks (to allow the muscles to heal so that he doesn't develop a hernia).  We also discussed return to work, which could occur in approximately three to four weeks if he had a desk job or would require not returning to work for at least six weeks if  "the job required any heavy lifting or exercise.  We discussed the potential for not getting his pep and energy back for anywhere from two to six weeks after surgery and how there was a tremendous individual variation in return of full energy level.  I discussed our donor follow-up studies; and that in our surveys, 90% of donors had their energy back by 6 weeks postdonation.    (5) The concern about long distance travel for the first couple of weeks post-donation.  We discussed the risks of deep vein thrombosis associated with plane or car travel.  I recommended that, if flying, he should get up and walk around every 30-40 minutes; if driving he should ask the  to stop the car every 30-45 minutes so Mr. Delgadillo could take a short walk.    (6) The fact that the recipient could be on a waiting for  donor transplant and would ultimately receive a kidney if Mr. Delgadillo did not donate.      (7) The difference between nondirected and directed donors.  I explained our policy of allocation of nondirected kidneys to the number one person on the list.  I also explained that by being a nondirected donor, they would not have the opportunity to meet the recipient (at least for the first six months) and, therefore, would not have the opportunity to see the potential \"benefit\" of the donation.  I also mentioned that our recipients often have not sent \"thank-you\" letters to their nondirected donors.  This would be an important thing to recognize as he went forward in thinking about nondirected donation.  Finally, we discussed the option of entering the paired exchange pool--its advantage, increasing the number of transplants from the donation; the disadvantage, harder to schedule and more logistics.    Finally, he has not yet told all of his family members about his interest in being a nondirected donor.  His mother and some friends know, but his father and sisters do not.  He plans to discuss with them if he is " approved.    I attempted to answer any remaining questions.  I also told him that should he have any questions, he should feel free to contact us.  We would be glad to answer any questions either over the phone or at another clinic visit.  His transplant coordinator is Rochelle Long and may be reached at [phone].  Thank you for the opportunity to see him.    I spent 30 minutes with this patient.  Over 95% of that time was spent in counseling and coordination of care.    As an aside, we have recently developed a risk calculator based on our long-term donor follow-up.  Our goal was to provide information to donors, donor candidates and the medical community. The calculator, available at the web site below (it loads slowly) provides a risk estimate over 40 years postdonation of developing diabetes, hypertension, proteinuria, reduced eGFR and ESRD.   We hope you find it useful,  (And please let us know if you have difficulties using it or have suggestions for improvement.)  [email or web address]           Yours truly,               Rory Spears MD         Professor of Surgery         ([phone])      RICKEY/

## 2020-09-04 NOTE — LETTER
"9/4/2020       RE: Ari Delgadillo  [address] [place] [address]     Dear Colleague,    Thank you for referring your patient, Ari Delgadillo, to the [facility] NEPHROLOGY at [facility]. Please see a copy of my visit note below.    Ari Delgadillo is a 24 year old male who is being evaluated via a billable video visit.      The patient has been notified of following:     \"This video visit will be conducted via a call between you and your physician/provider. We have found that certain health care needs can be provided without the need for an in-person physical exam.  This service lets us provide the care you need with a video conversation.  If a prescription is necessary we can send it directly to your pharmacy.  If lab work is needed we can place an order for that and you can then stop by our lab to have the test done at a later time.    Video visits are billed at different rates depending on your insurance coverage.  Please reach out to your insurance provider with any questions.    If during the course of the call the physician/provider feels a video visit is not appropriate, you will not be charged for this service.\"    Patient has given verbal consent for Video visit? Yes  How would you like to obtain your AVS? MyChart  If you are dropped from the video visit, the video invite should be resent to: Text to cell phone: MY CHART  Will anyone else be joining your video visit? No    Video-Visit Details    Type of service:  Video Visit    Video Start Time: 0847  Video End Time: 0910    Originating Location (pt. Location): Home    Distant Location (provider location):  [facility] NEPHROLOGY     Platform used for Video Visit: Rosamaria Garibay MD      TRANSPLANT NEPHROLOGY DONOR EVALUATION    Assessment and Plan:  # Prospective Kidney Transplant Donor: Patient with one issue that need to be addressed prior to donation. Patient's blood pressure is acceptable at this visit, " kidney function appears to be acceptable with Iohexol pending, and urinalysis is bland.    # Anxiety/Panic Attack: Patient reports no panic disorder issues for over 9 months and his anxiety is well managed.  He appears to be doing well.  Appreciate  input.    Discussed the risks of donating a kidney, including the surgical risk and the possible risks of living with one kidney.    Education about expected post-donation kidney function and how chronic kidney disease (CKD) and end stage kidney disease (ESKD) might potentially impact the donor in the future, include, but not limited to:       - On average, donors will have 25-35% permanent loss of kidney function at donation.       - Baseline risk of ESKD may slightly exceed that of members of the general population with the same demographic profile.       - Donor risks must be interpreted in light of known epidemiology of both CKD or ESKD, such as that CKD generally develops in midlife (40-50 years old) and ESKD generally develops after age 60.       - Medical evaluation of young potential donors cannot predict lifetime risk of CKD or ESKD.       - Donors may be at higher risk for CKD if they sustain damage to the remaining kidney.       - Development of CKD and progression of ESKD may be more rapid with only 1 kidney.       - Some type of kidney replacement therapy, either kidney transplant or dialysis, is required when reaching ESKD.    Potential medical or surgical risks include, but not limited to:       - Death.       - Scars, pain, fatigue, and other consequences typical of any surgical procedure.       - Decreased kidney function.       - Abdominal or bowel symptoms, such as bloating and nausea, and developing bowel obstruction.       - Kidney failure (ESKD) and the need for a kidney transplant or dialysis for the donor.       - Impact of obesity, hypertension, or other donor-specific medical conditions on morbidity and mortality of the potential  "donor.    Patients overall evaluation will be discussed with the transplant team and a final recommendation on the patients' suitability to be a kidney transplant donor will be made at that time.    Consult:  Ari Delgadillo was seen in consultation at the request of Dr. Raysa Nguyen for evaluation as a potential kidney transplant donor.    Reason for Visit:  Ari Delgadillo is a 24 year old male who presents for a kidney donor evaluation.  Patient would like to be a non-directed donor.    HPI:    Patient reports feeling good overall with minimal medical complaints.  Patient is interested in donating a kidney because he feels other \"people can benefit.\"  He did have some issues with a panic attack and anxiety previously, but with medications, reports no panic attack issues for over 9 months.  He feels he manages his anxiety well with medication and exercise.    His energy level is good and remains normal.  He is active and gets regular exercise.  Denies any chest pain or shortness of breath with exertion.  Appetite is good and weight is unchanged.  No nausea, vomiting or diarrhea.  No fever, sweats or chills.  No leg swelling.         Kidney Disease Hx:       h/o Kidney Problems: No  Family h/o Genetic Kidney Disease: No       h/o HTN: No    Usual BP: 120/80s       h/o Protein in Urine: No  h/o Blood in Urine: No       h/o Kidney Stones: No  h/o Kidney Injury: No       h/o Recurrent UTI: No  h/o Genitourinary Problems: No       h/o Chronic NSAID Use: No         Other Medical Hx:       h/o DM: No             h/o Gastrointestinal, Pancreas or Liver Problems: No       h/o Lung or Heart Problems: No       h/o Hematologic Problems: No  h/o Bleeding or Clotting Problems: No       h/o Cancer: No       h/o Infection Problems: No         Skin Cancer Risk:       h/o more than 50 moles: No       h/o extensive sun exposure: No       h/o melanoma: No       Family h/o melanoma: No         Mental Health Assessment:       h/o " Depression: No       h/o Psychiatric Illness: Yes: Anxiety and panic attack       h/o Suicidal Attempt(s): No    Review Of Systems:   A comprehensive review of systems was obtained and negative, except as noted in the HPI or PMH.    Past Medical History:   History was taken from the patient as noted below.  Past Medical History:   Diagnosis Date     Anxiety      Male hypogonadism      Panic disorder        Past Social History:   Past Surgical History:   Procedure Laterality Date     NO HISTORY OF SURGERY       Personal or family history of anesthesia problems: Does not know: Never had surgery    Family History:   Family History   Problem Relation Age of Onset     Other - See Comments Father         Hypogammaglobulinemia; Receives monthly IVIg     Hypertension Maternal Grandmother      Hypertension Maternal Uncle           Specific Family History:       FH of DM: No       FH of CAD: No  FH of HTN: No       FH of Cancer: No  FH of Kidney Cancer: No    Personal History:   Social History     Socioeconomic History     Marital status: Single     Spouse name: Not on file     Number of children: 0     Years of education: Not on file     Highest education level: Not on file   Occupational History     Not on file   Social Needs     Financial resource strain: Not on file     Food insecurity     Worry: Not on file     Inability: Not on file     Transportation needs     Medical: Not on file     Non-medical: Not on file   Tobacco Use     Smoking status: Current Some Day Smoker     Types: Cigars     Smokeless tobacco: Never Used     Tobacco comment: Rare cigar use   Substance and Sexual Activity     Alcohol use: Yes     Alcohol/week: 3.0 - 4.0 standard drinks     Types: 3 - 4 Standard drinks or equivalent per week     Drug use: Never     Sexual activity: Not on file   Lifestyle     Physical activity     Days per week: Not on file     Minutes per session: Not on file     Stress: Not on file   Relationships     Social connections     " Talks on phone: Not on file     Gets together: Not on file     Attends Hindu service: Not on file     Active member of club or organization: Not on file     Attends meetings of clubs or organizations: Not on file     Relationship status: Not on file     Intimate partner violence     Fear of current or ex partner: Not on file     Emotionally abused: Not on file     Physically abused: Not on file     Forced sexual activity: Not on file   Other Topics Concern     Not on file   Social History Narrative     Not on file          Specific Social History:       Health Insurance Status: Yes       Employment Status: Full time  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 Authorizing Provider   escitalopram (LEXAPRO) 5 MG tablet  10/1/19  Yes Reported, Patient   loratadine (CLARITIN) 10 MG tablet As needed for allergies   Yes Reported, Patient       Vitals:  Vital Signs 8/20/2020 8/20/2020 8/20/2020   Systolic 126 113 123   Diastolic 77 72 80   Pulse 71 67 67   Temperature 97.7 - -   Weight (LB) 177 lb 6.4 oz - -   Height 6' 1.25\" - -   BMI (Calculated) 23.25 - -   Pain - 0 -   O2 97 97 97       Exam:   GENERAL APPEARANCE: alert and no distress  HENT: no obvious abnormalities on appearance  RESP: breathing appears unremarkable with normal rate, no audible wheezing or cough and no apparent shortness of breath with conversation  MS: extremities normal - no gross deformities noted  SKIN: no apparent rash and normal skin tone  NEURO: speech is clear with no obvious neurological deficits  PSYCH: mentation appears normal and affect normal    Results:   Labs and imaging were ordered for this visit and reviewed by " me.    Again, thank you for allowing me to participate in the care of your patient.      Sincerely,    Yovani Garibay MD

## 2020-09-04 NOTE — PROGRESS NOTES
"Ari Delgadillo is a 24 year old male who is being evaluated via a billable video visit.      The patient has been notified of following:     \"This video visit will be conducted via a call between you and your physician/provider. We have found that certain health care needs can be provided without the need for an in-person physical exam.  This service lets us provide the care you need with a video conversation.  If a prescription is necessary we can send it directly to your pharmacy.  If lab work is needed we can place an order for that and you can then stop by our lab to have the test done at a later time.    Video visits are billed at different rates depending on your insurance coverage.  Please reach out to your insurance provider with any questions.    If during the course of the call the physician/provider feels a video visit is not appropriate, you will not be charged for this service.\"    Patient has given verbal consent for Video visit? Yes  How would you like to obtain your AVS? MyChart  If you are dropped from the video visit, the video invite should be resent to: Text to cell phone: MY CHART  Will anyone else be joining your video visit? No    Video-Visit Details    Type of service:  Video Visit    Video Start Time: 0847  Video End Time: 0910    Originating Location (pt. Location): Home    Distant Location (provider location):  [facility] NEPHROLOGY     Platform used for Video Visit: Tyler Garibay MD      TRANSPLANT NEPHROLOGY DONOR EVALUATION    Assessment and Plan:  # Prospective Kidney Transplant Donor: Patient with one issue that need to be addressed prior to donation. Patient's blood pressure is acceptable at this visit, kidney function appears to be acceptable with Iohexol pending, and urinalysis is bland.    # Anxiety/Panic Attack: Patient reports no panic disorder issues for over 9 months and his anxiety is well managed.  He appears to be doing well.  Appreciate  " input.    Discussed the risks of donating a kidney, including the surgical risk and the possible risks of living with one kidney.    Education about expected post-donation kidney function and how chronic kidney disease (CKD) and end stage kidney disease (ESKD) might potentially impact the donor in the future, include, but not limited to:       - On average, donors will have 25-35% permanent loss of kidney function at donation.       - Baseline risk of ESKD may slightly exceed that of members of the general population with the same demographic profile.       - Donor risks must be interpreted in light of known epidemiology of both CKD or ESKD, such as that CKD generally develops in midlife (40-50 years old) and ESKD generally develops after age 60.       - Medical evaluation of young potential donors cannot predict lifetime risk of CKD or ESKD.       - Donors may be at higher risk for CKD if they sustain damage to the remaining kidney.       - Development of CKD and progression of ESKD may be more rapid with only 1 kidney.       - Some type of kidney replacement therapy, either kidney transplant or dialysis, is required when reaching ESKD.    Potential medical or surgical risks include, but not limited to:       - Death.       - Scars, pain, fatigue, and other consequences typical of any surgical procedure.       - Decreased kidney function.       - Abdominal or bowel symptoms, such as bloating and nausea, and developing bowel obstruction.       - Kidney failure (ESKD) and the need for a kidney transplant or dialysis for the donor.       - Impact of obesity, hypertension, or other donor-specific medical conditions on morbidity and mortality of the potential donor.    Patients overall evaluation will be discussed with the transplant team and a final recommendation on the patients' suitability to be a kidney transplant donor will be made at that time.    Consult:  Ari Delgadillo was seen in consultation at the request of  "Dr. Raysa Nguyen for evaluation as a potential kidney transplant donor.    Reason for Visit:  Ari Delgadillo is a 24 year old male who presents for a kidney donor evaluation.  Patient would like to be a non-directed donor.    HPI:    Patient reports feeling good overall with minimal medical complaints.  Patient is interested in donating a kidney because he feels other \"people can benefit.\"  He did have some issues with a panic attack and anxiety previously, but with medications, reports no panic attack issues for over 9 months.  He feels he manages his anxiety well with medication and exercise.    His energy level is good and remains normal.  He is active and gets regular exercise.  Denies any chest pain or shortness of breath with exertion.  Appetite is good and weight is unchanged.  No nausea, vomiting or diarrhea.  No fever, sweats or chills.  No leg swelling.         Kidney Disease Hx:       h/o Kidney Problems: No  Family h/o Genetic Kidney Disease: No       h/o HTN: No    Usual BP: 120/80s       h/o Protein in Urine: No  h/o Blood in Urine: No       h/o Kidney Stones: No  h/o Kidney Injury: No       h/o Recurrent UTI: No  h/o Genitourinary Problems: No       h/o Chronic NSAID Use: No         Other Medical Hx:       h/o DM: No             h/o Gastrointestinal, Pancreas or Liver Problems: No       h/o Lung or Heart Problems: No       h/o Hematologic Problems: No  h/o Bleeding or Clotting Problems: No       h/o Cancer: No       h/o Infection Problems: No         Skin Cancer Risk:       h/o more than 50 moles: No       h/o extensive sun exposure: No       h/o melanoma: No       Family h/o melanoma: No         Mental Health Assessment:       h/o Depression: No       h/o Psychiatric Illness: Yes: Anxiety and panic attack       h/o Suicidal Attempt(s): No    Review Of Systems:   A comprehensive review of systems was obtained and negative, except as noted in the HPI or PMH.    Past Medical History:   History was " taken from the patient as noted below.  Past Medical History:   Diagnosis Date     Anxiety      Male hypogonadism      Panic disorder        Past Social History:   Past Surgical History:   Procedure Laterality Date     NO HISTORY OF SURGERY       Personal or family history of anesthesia problems: Does not know: Never had surgery    Family History:   Family History   Problem Relation Age of Onset     Other - See Comments Father         Hypogammaglobulinemia; Receives monthly IVIg     Hypertension Maternal Grandmother      Hypertension Maternal Uncle           Specific Family History:       FH of DM: No       FH of CAD: No  FH of HTN: No       FH of Cancer: No  FH of Kidney Cancer: No    Personal History:   Social History     Socioeconomic History     Marital status: Single     Spouse name: Not on file     Number of children: 0     Years of education: Not on file     Highest education level: Not on file   Occupational History     Not on file   Social Needs     Financial resource strain: Not on file     Food insecurity     Worry: Not on file     Inability: Not on file     Transportation needs     Medical: Not on file     Non-medical: Not on file   Tobacco Use     Smoking status: Current Some Day Smoker     Types: Cigars     Smokeless tobacco: Never Used     Tobacco comment: Rare cigar use   Substance and Sexual Activity     Alcohol use: Yes     Alcohol/week: 3.0 - 4.0 standard drinks     Types: 3 - 4 Standard drinks or equivalent per week     Drug use: Never     Sexual activity: Not on file   Lifestyle     Physical activity     Days per week: Not on file     Minutes per session: Not on file     Stress: Not on file   Relationships     Social connections     Talks on phone: Not on file     Gets together: Not on file     Attends Anabaptism service: Not on file     Active member of club or organization: Not on file     Attends meetings of clubs or organizations: Not on file     Relationship status: Not on file     Intimate  "partner violence     Fear of current or ex partner: Not on file     Emotionally abused: Not on file     Physically abused: Not on file     Forced sexual activity: Not on file   Other Topics Concern     Not on file   Social History Narrative     Not on file          Specific Social History:       Health Insurance Status: Yes       Employment Status: Full time  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 Authorizing Provider   escitalopram (LEXAPRO) 5 MG tablet  10/1/19  Yes Reported, Patient   loratadine (CLARITIN) 10 MG tablet As needed for allergies   Yes Reported, Patient       Vitals:  Vital Signs 8/20/2020 8/20/2020 8/20/2020   Systolic 126 113 123   Diastolic 77 72 80   Pulse 71 67 67   Temperature 97.7 - -   Weight (LB) 177 lb 6.4 oz - -   Height 6' 1.25\" - -   BMI (Calculated) 23.25 - -   Pain - 0 -   O2 97 97 97       Exam:   GENERAL APPEARANCE: alert and no distress  HENT: no obvious abnormalities on appearance  RESP: breathing appears unremarkable with normal rate, no audible wheezing or cough and no apparent shortness of breath with conversation  MS: extremities normal - no gross deformities noted  SKIN: no apparent rash and normal skin tone  NEURO: speech is clear with no obvious neurological deficits  PSYCH: mentation appears normal and affect normal    Results:   Labs and imaging were ordered for this visit and reviewed by me.  "

## 2020-09-04 NOTE — LETTER
9/4/2020         RE: Ari Delgadillo  [address] [place] [address]        Dear Colleague,    Thank you for referring your patient, Ari Delgadillo, to the [facility] SOLID ORGAN TRANSPLANT. Please see a copy of my visit note below.    I called today to provide education on living kidney donation.  The format for our conversation was talking on the phone and looking simultaneously at the slides for the Living Kidney Donor Informed Consent and SRTR form.  The patient has offered to be a nondirect donor.      Discussed surgery hospitalization and recovery.    Knows when and how to obtain evaluation results and the process for scheduling surgery.  Has received and reviewed the donor education materials including the Informed Consent for Living Kidney Donation.  The patient will sign the consent for donor evaluation and send it back to the Transplant Office.  Reviewed SRTR Data sheet.  Donor was Provided Living Donor Collective (LDC) Materials? Yes  Donor has agreed to be contacted by SRTR for follow-up LDC Questions? Yes  Reviewed that our center is participating in the Living Donor Collective to study the long-term outcomes of living organ donation.  Patient was born in the [place]  The patient states is a Non-Smoker.  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  He stated workplace is supportive as long as he plans things around the projects.  Timing for his donation would be after hunting season.  Late November/December to the Spring.      Nondirected donor Program:  I reviewed the possible ways that a NonDirect Donor can give a kidney.  One being running a matchrun after setting a date and the kidney would be allocated to the person at the top of the list.  I also reviewed that details pertaining to the Paired Exchange program National Kidney Registry.                         Patient has been sent consent forms for the KPD program via [employer].  D  Logistics:    I reviewed that in KPD you are not able to choose your match, but they may decline a match.  I showed possible scenarios that can happen when a chain is put together where there is a possibility of helping more than one candidate receive a kidney.     I reviewed that the timeline for listing and finding a match is unknown, there may be significant waiting time to find a matched based on blood type.     I described the timing of a typical KPD event, where the donor cases go early in the morning and then the kidneys are shipped to the recipient site.     That the donor s kidney could be lost in transport, and other potentially negative consequences related to shipping a kidney.  Insurance:   I also reviewed that in D program the insurance of the matched recipient will be paying the donation costs.  I also reviewed:              1. The possibility that the matched candidate s insurance might not cover travel costs if the paired donor travels to the matched recipient transplant hospital.              2. The possibility of the paired donor s name appearing on the matched candidate s insurance estimation of benefits.    [facility] Blood Draw and Testing requirements:   I reviewed that frequent lab draws are necessary in the KPD program.  I explained that [facility] will send a kit when we initiate listing so that blood can be frozen to be used for exploratory crossmatches.  I stated that there is also additional testing needed when a match offer is given for confirmatory compatibility testing and infectious disease testing.  I reviewed that the donor's evaluation is good for one year.  If we are waiting after one year, then labs will need to be done to update the evaluation.    Correspondence with Matched Recipient:  I reviewed that it is okay to send a card to the recipient, that we ask that No identifiers be on the correspondence.  I stated that if all parties are willing, the Transplant office can facilitate meetings  between matched donors and recipients.  I reviewed that the patient has the right to withdraw from participation in the D program at any time, for any reason.     Living Kidney Donor Informed Consent per [facility] Policy for Transplant Coordinators:  I provided to the patient education and instruction about all phases of the living donation process that included:    A copy of the entire consent with SRTR outcomes.    Information regarding the financial impact of donation Results of medical and psychosocial evaluations.    Information stating that the [facility] may refuse you as a donor,  however, you could be evaluated by another transplant program with different selection criteria.    Instructions for pre- and post-operative care that state as a potential donor, you must commit to postoperative follow-up testing at 6 weeks, 6 months, 1 year, and 2 years.    Information was presented in a language in which the patient was able to engage in meaningful dialogue by use of an  if needed.    It is a federal crime for any person to knowingly acquire, obtain or otherwise transfer any human organ for valuable consideration.    [facility] takes all reasonable precautions to provide confidentiality for you, and the recipient. However, all health information obtained during the evaluation is subject to the same regulations as all records and could reveal conditions that must be reported to local, state, or federal public health authorities.    Disclosure of alternative procedures or courses of treatment for the recipient, including  donor transplant.    A  donor kidney may become available for the recipient before your donor evaluation is complete or transplant occurs.    Recipients are determined to be transplant candidates based on specific guidelines and clinical  judgement.    The recipient may have risk factors for increased morbidity or mortality that are  above local or national averages do not prohibit transplantation and are not disclosed to you.    Rochelle Long RN  Living Donor Coordinator  09/04/2020 3:19 PM     Again, thank you for allowing me to participate in the care of your patient.        Sincerely,        Rochelle Long RN

## 2020-09-04 NOTE — LETTER
"    9/4/2020         RE: Ari Delgadillo  [address] [place] [address]        Dear Colleague,    Thank you for referring your patient, Ari Delgadillo, to the [facility] SOLID ORGAN TRANSPLANT. Please see a copy of my visit note below.    Ari Delgadillo is a 24 year old male who is being evaluated via a billable video visit.      The patient has been notified of following:     \"This video visit will be conducted via a call between you and your physician/provider. We have found that certain health care needs can be provided without the need for an in-person physical exam.  This service lets us provide the care you need with a video conversation.  If a prescription is necessary we can send it directly to your pharmacy.  If lab work is needed we can place an order for that and you can then stop by our lab to have the test done at a later time.    Video visits are billed at different rates depending on your insurance coverage.  Please reach out to your insurance provider with any questions.    If during the course of the call the physician/provider feels a video visit is not appropriate, you will not be charged for this service.\"    Patient has given verbal consent for Video visit? Yes  How would you like to obtain your AVS? MyChart  If you are dropped from the video visit, the video invite should be resent to: Text to cell phone: [phone]  Will anyone else be joining your video visit? No        Video-Visit Details    Type of service:  Video Visit    Video Start Time: 9:35am  Video End Time: 10:05am    Originating Location (pt. Location): home    Distant Location (provider location):  [facility] SOLID ORGAN TRANSPLANT     Platform used for Video Visit: doximity    RE: Ari Delgadillo  [facility] #[id number]             I saw your patient, Ari Delgadillo, in consultation in our pretransplant clinic.  He presented via video visit for evaluation as a possible kidney donor. We discussed:    (1) The risks of donation--including mortality " (0.03% risk) and morbidity (approximately 1% risk of major morbidity).  We discussed the major reported causes of death after kidney donation (bleeding, infection, pulmonary embolism).  We discussed the potential complications, including:  bleeding requiring reoperation, infection requiring reoperation, pneumonia, bowel obstruction, infection at the site of the incision, hernia at the site of the incision, deep vein thrombosis, deep vein thrombosis with associated pulmonary embolism, and urinary tract infection.  I told him I could not possibly name all of the risks, but that he was at risk for any complications that could occur in any operation (and that a local library would have books/resources that could provide more detail).       (2) We also discussed the long-term risks of living with one kidney.  We discussed the rare diseases that might affect only one kidney.  We talked about the new publications suggesting slightly increased long-term risk of ESRD after donor nephrectomy.  For people his age, there is really no data to show what the outcome will be at 50-60 years.  This is an important consideration.  We discussed the rare diseases that might affect having only one kidney.    (3) The hospital stay and the fact that if all goes well, discharge would occur within two to three days after donor nephrectomy.  We also discussed the fact that there would be no diet or fluid restrictions (again if all went well), and that the only new medication that he would be on would be pain medication.  We discussed the fact that most patients are on Tylenol or something similar within five to six days of surgery.      (4) The recovery time after surgery and the restrictions that are necessary: a) no driving for a couple of weeks (or until he felt that his/her reaction would be adequate if he had to slam on the brakes; and b) no lifting over 10 pounds or any exercise that would stretch his abdominal muscles for six weeks (to  "allow the muscles to heal so that he doesn't develop a hernia).  We also discussed return to work, which could occur in approximately three to four weeks if he had a desk job or would require not returning to work for at least six weeks if the job required any heavy lifting or exercise.  We discussed the potential for not getting his pep and energy back for anywhere from two to six weeks after surgery and how there was a tremendous individual variation in return of full energy level.  I discussed our donor follow-up studies; and that in our surveys, 90% of donors had their energy back by 6 weeks postdonation.    (5) The concern about long distance travel for the first couple of weeks post-donation.  We discussed the risks of deep vein thrombosis associated with plane or car travel.  I recommended that, if flying, he should get up and walk around every 30-40 minutes; if driving he should ask the  to stop the car every 30-45 minutes so Mr. Delgadillo could take a short walk.    (6) The fact that the recipient could be on a waiting for  donor transplant and would ultimately receive a kidney if Mr. Delgadillo did not donate.      (7) The difference between nondirected and directed donors.  I explained our policy of allocation of nondirected kidneys to the number one person on the list.  I also explained that by being a nondirected donor, they would not have the opportunity to meet the recipient (at least for the first six months) and, therefore, would not have the opportunity to see the potential \"benefit\" of the donation.  I also mentioned that our recipients often have not sent \"thank-you\" letters to their nondirected donors.  This would be an important thing to recognize as he went forward in thinking about nondirected donation.  Finally, we discussed the option of entering the paired exchange pool--its advantage, increasing the number of transplants from the donation; the disadvantage, harder to schedule and " more logistics.    Finally, he has not yet told all of his family members about his interest in being a nondirected donor.  His mother and some friends know, but his father and sisters do not.  He plans to discuss with them if he is approved.    I attempted to answer any remaining questions.  I also told him that should he have any questions, he should feel free to contact us.  We would be glad to answer any questions either over the phone or at another clinic visit.  His transplant coordinator is Rochelle Long and may be reached at [phone].  Thank you for the opportunity to see him.    I spent 30 minutes with this patient.  Over 95% of that time was spent in counseling and coordination of care.    As an aside, we have recently developed a risk calculator based on our long-term donor follow-up.  Our goal was to provide information to donors, donor candidates and the medical community. The calculator, available at the web site below (it loads slowly) provides a risk estimate over 40 years postdonation of developing diabetes, hypertension, proteinuria, reduced eGFR and ESRD.   We hope you find it useful,  (And please let us know if you have difficulties using it or have suggestions for improvement.)  [email or web address]           Yours truly,               Rory Spears MD         Professor of Surgery         ([phone])      RICKEY/          Again, thank you for allowing me to participate in the care of your patient.        Sincerely,        Rory Spears MD

## 2020-09-04 NOTE — LETTER
Date:September 10, 2020      Patient was self referred, no letter generated. Do not send.        [facility] Information

## 2020-09-09 ENCOUNTER — TELEPHONE (OUTPATIENT)
Dept: TRANSPLANT | Facility: CLINIC | Age: 25
End: 2020-09-09

## 2020-09-09 ENCOUNTER — COMMITTEE REVIEW (OUTPATIENT)
Dept: TRANSPLANT | Facility: CLINIC | Age: 25
End: 2020-09-09

## 2020-09-09 NOTE — COMMITTEE REVIEW
Living Donor Committee Review Note Evaluation Date: 9/4/2020  Committee Review Date: 9/9/2020    Donor being evaluated for: Kidney    Transplant Phase: Evaluation  Transplant Status: Active    Transplant Coordinator: Rochelle Long  Transplant Surgeon:       Committee Review Members:  Immunology Forest Carlson, PhD   Nephrology Tu Yusuf MD, Keya Collins MD   Nutrition Shania Engle,    Pharmacy John Bates, [facility]    - Clinical Loni Ramesh, [facility]   Transplant Violet Tequila Madsen, RN, Daisha Hendrickson, FAREED, Rory Spears MD, Myra Leyva, FAREED, Claudine Damon LPN, Rochelle Long, FAREED, Kallie Monroe, FAREED, Yovani Garibay MD, Raysa Nguyen MD, MD       Transplant Eligibility: Acceptable Mental Health    Committee Review Decision: Needs Re-presentation    Relative Contraindications:     Absolute Contraindications:     Committee Chair Raysa Nguyen MD verbally attested to the committee's decision.    Committee Discussion Details: Reviewed the Virtual Evaluation Visits.  Recommend that the patient continue with donor testing.  Recommend that the patient's support from family is assessed further once his medical testing is completed.

## 2020-09-09 NOTE — TELEPHONE ENCOUNTER
I called the patient to update him after the Donor Team meeting.  I let him know that the donor team does not have any concerns and it is okay to schedule the medical appointments.  I also reviewed that our Nondirect donor program has the requirement of a NeuroPsych visit so that will need to be scheduled as well.  The patient stated understanding and he let me know that he is still interested in moving forward.  He said his Fridays are fairly flexible.  I will place orders and send task to schedulers.  We decided to talk tomorrow at 10:00 regarding Kidney Paired donation program information.

## 2020-09-10 ENCOUNTER — TELEPHONE (OUTPATIENT)
Dept: TRANSPLANT | Facility: CLINIC | Age: 25
End: 2020-09-10

## 2020-09-10 NOTE — TELEPHONE ENCOUNTER
I called the patient today.  We reviewed plans for scheduling the medical tests.  I reviewed that he will need Neuro Psych visit as well.  I will put orders in and task tot he .  We reviewed the [facility] consent form.  I described shipping of the kidney.  Program requirements for listing.  Process for match offers and timing for surgery.  I answered his questions.  I reviewed information on the Family Voucher program.  I provided to the patient education materials describing the program and scenarios where a designated family member could redeem a kidney in the future if they are designated with a voucher consent in the NKR system prior to Luke donating.  I provided him via email the voucher program consent forms.  He stated he is interested in doing this and it will help him as he speaks to his parents about his wish to donate.  The patient will complete the docusign consent forms that had previously been sent.

## 2020-09-14 DIAGNOSIS — Z00.5 TRANSPLANT DONOR EVALUATION: Primary | ICD-10-CM

## 2020-10-01 ENCOUNTER — VIRTUAL VISIT (OUTPATIENT)
Dept: NEUROPSYCHOLOGY | Facility: CLINIC | Age: 25
End: 2020-10-01
Attending: INTERNAL MEDICINE

## 2020-10-01 DIAGNOSIS — Z00.5 TRANSPLANT DONOR EVALUATION: ICD-10-CM

## 2020-10-01 PROCEDURE — 96130 PSYCL TST EVAL PHYS/QHP 1ST: CPT | Mod: 95

## 2020-10-01 PROCEDURE — 90791 PSYCH DIAGNOSTIC EVALUATION: CPT | Mod: 95

## 2020-10-01 PROCEDURE — 96138 PSYCL/NRPSYC TECH 1ST: CPT | Mod: 95

## 2020-10-01 NOTE — PROGRESS NOTES
The patient was seen for psychological evaluation via telehealth at the request of Yovani Garibay MD for the purposes of diagnostic clarification and treatment planning.  38 minutes of vidoe test administration and scoring were provided by this writer.  Please see Dr. Jose Brown's report for a full interpretation of the findings.    Video Start Time 1: 9:22AM  Video End Time 1: 9:24AM    Video Start Time 2: 9:58AM  Video End Time 2: 10:31AM    Tere Rider  Psychometrist

## 2020-10-02 ENCOUNTER — ANCILLARY PROCEDURE (OUTPATIENT)
Dept: GENERAL RADIOLOGY | Facility: CLINIC | Age: 25
End: 2020-10-02
Attending: INTERNAL MEDICINE

## 2020-10-02 ENCOUNTER — INFUSION THERAPY VISIT (OUTPATIENT)
Dept: INFUSION THERAPY | Facility: CLINIC | Age: 25
End: 2020-10-02
Attending: INTERNAL MEDICINE

## 2020-10-02 ENCOUNTER — ANCILLARY PROCEDURE (OUTPATIENT)
Dept: CT IMAGING | Facility: CLINIC | Age: 25
End: 2020-10-02
Attending: INTERNAL MEDICINE

## 2020-10-02 ENCOUNTER — OFFICE VISIT (OUTPATIENT)
Dept: TRANSPLANT | Facility: CLINIC | Age: 25
End: 2020-10-02
Attending: TRANSPLANT SURGERY

## 2020-10-02 VITALS
DIASTOLIC BLOOD PRESSURE: 72 MMHG | SYSTOLIC BLOOD PRESSURE: 121 MMHG | BODY MASS INDEX: 23.33 KG/M2 | HEIGHT: 73 IN | HEART RATE: 53 BPM | OXYGEN SATURATION: 98 % | WEIGHT: 176 LBS

## 2020-10-02 DIAGNOSIS — Z00.5 TRANSPLANT DONOR EVALUATION: ICD-10-CM

## 2020-10-02 DIAGNOSIS — Z00.5 TRANSPLANT DONOR EVALUATION: Primary | ICD-10-CM

## 2020-10-02 LAB
ABO + RH BLD: NORMAL
ALBUMIN SERPL-MCNC: 4.2 G/DL (ref 3.4–5)
ALP SERPL-CCNC: 68 U/L (ref 40–150)
ALT SERPL W P-5'-P-CCNC: 36 U/L (ref 0–70)
ANION GAP SERPL CALCULATED.3IONS-SCNC: 8 MMOL/L (ref 3–14)
APTT PPP: 30 SEC (ref 22–37)
AST SERPL W P-5'-P-CCNC: 24 U/L (ref 0–45)
BILIRUB SERPL-MCNC: 0.9 MG/DL (ref 0.2–1.3)
BLD GP AB SCN SERPL QL: NORMAL
BLOOD BANK CMNT PATIENT-IMP: NORMAL
BUN SERPL-MCNC: 17 MG/DL (ref 7–30)
CALCIUM SERPL-MCNC: 9.4 MG/DL (ref 8.5–10.1)
CHLORIDE SERPL-SCNC: 104 MMOL/L (ref 94–109)
CHOLEST SERPL-MCNC: 129 MG/DL
CO2 SERPL-SCNC: 29 MMOL/L (ref 20–32)
CREAT SERPL-MCNC: 1.09 MG/DL (ref 0.66–1.25)
ERYTHROCYTE [DISTWIDTH] IN BLOOD BY AUTOMATED COUNT: 11.6 % (ref 10–15)
GFR SERPL CREATININE-BSD FRML MDRD: >90 ML/MIN/{1.73_M2}
GLUCOSE SERPL-MCNC: 101 MG/DL (ref 70–99)
HBA1C MFR BLD: 5.4 % (ref 0–5.6)
HBV CORE AB SERPL QL IA: NONREACTIVE
HBV SURFACE AB SERPL IA-ACNC: 2.64 M[IU]/ML
HBV SURFACE AG SERPL QL IA: NONREACTIVE
HCT VFR BLD AUTO: 44.1 % (ref 40–53)
HCV AB SERPL QL IA: NONREACTIVE
HDLC SERPL-MCNC: 63 MG/DL
HGB BLD-MCNC: 14.9 G/DL (ref 13.3–17.7)
HIV 1+2 AB+HIV1 P24 AG SERPL QL IA: NONREACTIVE
INR PPP: 1.07 (ref 0.86–1.14)
INTERPRETATION ECG - MUSE: NORMAL
LDLC SERPL CALC-MCNC: 58 MG/DL
MCH RBC QN AUTO: 29.9 PG (ref 26.5–33)
MCHC RBC AUTO-ENTMCNC: 33.8 G/DL (ref 31.5–36.5)
MCV RBC AUTO: 89 FL (ref 78–100)
NONHDLC SERPL-MCNC: 66 MG/DL
PHOSPHATE SERPL-MCNC: 4 MG/DL (ref 2.5–4.5)
PLATELET # BLD AUTO: 226 10E9/L (ref 150–450)
POTASSIUM SERPL-SCNC: 4 MMOL/L (ref 3.4–5.3)
PROT SERPL-MCNC: 7.7 G/DL (ref 6.8–8.8)
RBC # BLD AUTO: 4.98 10E12/L (ref 4.4–5.9)
SODIUM SERPL-SCNC: 140 MMOL/L (ref 133–144)
SPECIMEN EXP DATE BLD: NORMAL
SPECIMEN EXP DATE BLD: NORMAL
T PALLIDUM AB SER QL: NONREACTIVE
TRIGL SERPL-MCNC: 41 MG/DL
URATE SERPL-MCNC: 5.9 MG/DL (ref 3.5–7.2)
WBC # BLD AUTO: 4.5 10E9/L (ref 4–11)

## 2020-10-02 PROCEDURE — 93010 ELECTROCARDIOGRAM REPORT: CPT | Performed by: INTERNAL MEDICINE

## 2020-10-02 PROCEDURE — 87340 HEPATITIS B SURFACE AG IA: CPT | Performed by: INTERNAL MEDICINE

## 2020-10-02 PROCEDURE — 84100 ASSAY OF PHOSPHORUS: CPT | Performed by: PATHOLOGY

## 2020-10-02 PROCEDURE — 85610 PROTHROMBIN TIME: CPT | Performed by: PATHOLOGY

## 2020-10-02 PROCEDURE — 74175 CTA ABDOMEN W/CONTRAST: CPT | Performed by: RADIOLOGY

## 2020-10-02 PROCEDURE — 86901 BLOOD TYPING SEROLOGIC RH(D): CPT | Performed by: PATHOLOGY

## 2020-10-02 PROCEDURE — 83036 HEMOGLOBIN GLYCOSYLATED A1C: CPT | Performed by: PATHOLOGY

## 2020-10-02 PROCEDURE — 84550 ASSAY OF BLOOD/URIC ACID: CPT | Performed by: PATHOLOGY

## 2020-10-02 PROCEDURE — 86850 RBC ANTIBODY SCREEN: CPT | Performed by: PATHOLOGY

## 2020-10-02 PROCEDURE — 93000 ELECTROCARDIOGRAM COMPLETE: CPT | Mod: 26

## 2020-10-02 PROCEDURE — 36415 COLL VENOUS BLD VENIPUNCTURE: CPT | Performed by: PATHOLOGY

## 2020-10-02 PROCEDURE — 999N001110 HC STATISTIC HIV 1/2 ANTIGEN/ANTIBODY PRETRANSPLANT ONLY: Performed by: INTERNAL MEDICINE

## 2020-10-02 PROCEDURE — 250N000011 HC RX IP 250 OP 636: Mod: JW | Performed by: INTERNAL MEDICINE

## 2020-10-02 PROCEDURE — 86803 HEPATITIS C AB TEST: CPT | Performed by: INTERNAL MEDICINE

## 2020-10-02 PROCEDURE — 86481 TB AG RESPONSE T-CELL SUSP: CPT | Performed by: INTERNAL MEDICINE

## 2020-10-02 PROCEDURE — 80053 COMPREHEN METABOLIC PANEL: CPT | Performed by: PATHOLOGY

## 2020-10-02 PROCEDURE — 82542 COL CHROMOTOGRAPHY QUAL/QUAN: CPT | Performed by: INTERNAL MEDICINE

## 2020-10-02 PROCEDURE — 86706 HEP B SURFACE ANTIBODY: CPT | Performed by: INTERNAL MEDICINE

## 2020-10-02 PROCEDURE — 86900 BLOOD TYPING SEROLOGIC ABO: CPT | Mod: 90 | Performed by: PATHOLOGY

## 2020-10-02 PROCEDURE — 85027 COMPLETE CBC AUTOMATED: CPT | Performed by: PATHOLOGY

## 2020-10-02 PROCEDURE — 99000 SPECIMEN HANDLING OFFICE-LAB: CPT | Mod: 26 | Performed by: PATHOLOGY

## 2020-10-02 PROCEDURE — 71046 X-RAY EXAM CHEST 2 VIEWS: CPT | Mod: GC | Performed by: RADIOLOGY

## 2020-10-02 PROCEDURE — 86704 HEP B CORE ANTIBODY TOTAL: CPT | Performed by: INTERNAL MEDICINE

## 2020-10-02 PROCEDURE — 86644 CMV ANTIBODY: CPT | Performed by: INTERNAL MEDICINE

## 2020-10-02 PROCEDURE — 86780 TREPONEMA PALLIDUM: CPT | Performed by: INTERNAL MEDICINE

## 2020-10-02 PROCEDURE — 86665 EPSTEIN-BARR CAPSID VCA: CPT | Performed by: INTERNAL MEDICINE

## 2020-10-02 PROCEDURE — 99207 PR NO CHARGE NURSE ONLY: CPT

## 2020-10-02 PROCEDURE — 86900 BLOOD TYPING SEROLOGIC ABO: CPT | Performed by: PATHOLOGY

## 2020-10-02 PROCEDURE — 85730 THROMBOPLASTIN TIME PARTIAL: CPT | Performed by: PATHOLOGY

## 2020-10-02 PROCEDURE — 99207 PR NO CHARGE LOS: CPT

## 2020-10-02 RX ORDER — IOPAMIDOL 755 MG/ML
100 INJECTION, SOLUTION INTRAVASCULAR ONCE
Status: COMPLETED | OUTPATIENT
Start: 2020-10-02 | End: 2020-10-02

## 2020-10-02 RX ADMIN — IOPAMIDOL 100 ML: 755 INJECTION, SOLUTION INTRAVASCULAR at 14:07

## 2020-10-02 RX ADMIN — IOHEXOL 5 ML: 300 INJECTION, SOLUTION INTRAVENOUS at 09:54

## 2020-10-02 ASSESSMENT — MIFFLIN-ST. JEOR: SCORE: 1847.21

## 2020-10-02 NOTE — LETTER
"    10/2/2020         RE: Ari Delgadillo  [address] [place] [address]        Dear Colleague,    Thank you for referring your patient, Ari Delgadillo, to the [facility]. Please see a copy of my visit note below.    Chief Complaint   Patient presents with     Transplant Donor Evaluation     kidney     Blood pressure 121/72, pulse 53, height 1.854 m (6' 1\"), weight 79.8 kg (176 lb), SpO2 98 %.      Donor in today for a three Blood Pressure assessment and vitals , each Blood Pressure is 15 minutes apart, patient given a free voucher for parking.      Hortencia Chan CMA         Again, thank you for allowing me to participate in the care of your patient.        Sincerely,        Transplant Evaluation Resource    "

## 2020-10-02 NOTE — PROGRESS NOTES
Donor Iohexol test    Ari Delgadillo presents today to [facility] for a Donor Iohexol test.      Progress note:  ID verified by name and .     The following information was verified with the patient:  Female Patients is there any possibility of being pregnant No  Is there a history of allergy (skin rash, swelling, ect) to:   A.  Iodine (except skin reactions to betadine): No   B. Intravenous radio-contrast agents: No   C. Seafood No     present during visit today: Not Applicable.    R.N. provided patient with educational handout regarding timed test. Yes    Iohexol administered over 2 minutes via butterfly  Positive blood return verified before and after injection.   20 gauge PIV placed in Lt AC  for blood draws and CT this afternoon.    Medication administered:  Iohexol (Omnipaque 300mg iodine/ml concentration) 5 mls.    Start time: 954  Stop time: 956    Drug Waste Record    Drug Name: Iohexol  Dose: 5 ml  Route administered: IV  NDC #: [id number]  Amount of waste(mL):5 ml  Reason for waste: Single use vial    Administrations This Visit     iohexol (OMNIPAQUE 300) injection 5 mL     Admin Date  10/02/2020 Action  Given Dose  5 mL Route  Intravenous Administered By  Marilyn Rubin RN                    Evaluation nurse in transplant to draw labs at 2 and 4 hours post iohexol administration.  Patient given a slip with the times to get labs drawn and verbalized understanding of the plan.    Patient tolerated the procedure:  Yes    After the infusion patient was discharged to the next appointment.    Marilyn Rubin RN

## 2020-10-02 NOTE — PROGRESS NOTES
"Ari Delgadillo is a 24-year-old man who is being evaluated via a billable video visit.      The patient has been notified of following:   \"This video visit will be conducted via a call between you and your physician/provider. We have found that certain health care needs can be provided without the need for an in-person exam.  This service lets us provide the care you need with a video conversation.  Video visits are billed at different rates depending on your insurance coverage.  Please reach out to your insurance provider with any questions.  If during the course of the call the provider feels a video visit is not appropriate, you will not be charged for this service.\"     Patient has given verbal consent for Video visit? Yes  Patient would like the video invitation sent by: Send to e-mail at: [email or web address]  Will anyone else be joining your video visit? No     Video-Visit Details  Type of service:  Video Conference Call  Interview:     Video Start Time: 9:29 AM     Video End Time: 9:52 AM  Formal Testing:     Video Start Time: 9:58 AM     Video End Time: 10:31 AM  Originating Location (pt. Location): Home  Distant Location (provider location):   [facility] NEUROPSYCHOLOGY   Platform used for Video Visit: [employer]      NAME: Ari Delgadillo   MRN: [id number]    : 1995  GARY: 10/1/2020  Neuropsychology Laboratory  [address]  [address]  [phone]    NEUROPSYCHOLOGICAL EVALUATION    RELEVANT HISTORY AND REASON FOR REFERRAL    This is a report of neuropsychological consultation regarding Ari Delgadillo, a 24-year-old man with 16 years of formal education. He is considering donating his kidney anonymously. He is referred for the requisite neuropsychological evaluation, to better understand psychosocial factors that bear on live organ donation candidacy. The referral order was placed by MADALYN Pace, CNP.     Mr. Delgadillo is aware of what the workup " process entails and the steps left in determining his candidacy. He has had kidney donation in the back of mind since he was young, when he learned that only one kidney is necessary for survival and that there are people in need of transplants. He holds an ethos that individual decisions are needed to foster better outcomes for the larger group, and donating a kidney is a decision he is making to do his part. Hopes for outcomes are that his health continues to be good and that the donated organ is not rejected and works for the recipient. It would be disappointing if he could not donate, but then he would go about life as it is. He understands that donating his kidney includes a risk of death, hernia, infections, and increased risk for hypertension.    Mr. Delgadillo is unmarried and does not have children. He lives with three roommates. He counts   his mother and one of his roommates, with whom he has lived for more than five years, as primary sources of support, along with his father, two sisters, and brother. He has spoken with them about transplant options. They were a little surprised. His mother has questioned the need to take a surgical risk without clear benefit, but she also seems to be a bit proud of his decision and to understand his reasoning. If he was in need of extra oversight and support during his postsurgical recovery, he would probably go to his sister s home. His brother-in-law is currently working from home and would be available as needed.    About 2-1/2 years ago, he started to experience clinically significant anxiety with elements of panic. He says this was related to situational factors, including the end of a relationship and trying to decide what path to take in his schooling (to try to pursue med school or take another route). He sought out appropriate clinical care. He was engaged in psychotherapy, though it did not feel like a good fit and only lasted a couple of sessions. He took  medications, and he says that they worked great for him. Over the last six months, he gradually weaned off of his medications. He has now been completely off them for about a month, and he feels like he is doing well. He has positive coping skills, including managing his sleep and exercising regularly. His sleep is good these days, much improved from where it was two years ago. He gets about 6-7 hours overnight, and he does not have issues with insomnia. He is not exactly rested in the mornings, but he is fine once he has a cup of coffee and gets into his morning routine. He does not usually take naps. He has plenty of interest and motivation for enjoyable activities. There is no other significant period of mental health concerns. He has never had a psychiatric hospitalization. He denies any suicidal ideation. He has never had a hallucinatory experience. He has not experienced specific traumas or abuse.     He reports an alcohol habit of a couple of glasses of beer or wine per week. He denies any history of problematic drinking or risk factors for alcohol misuse (CAGE-AID = 0). He does not use illicit drugs. He occasionally has a cigar with a friend, but this is not a routine. He does not use any vaping products. He does not have any concerns with the legal system. He does not lebron. He denies any other addictive/compulsive style behaviors.    He does not perceive any problems with his cognitive functioning. Historically, he was always above average in school. He was placed in gifted and talented tracks. After high school, he completed a college degree at [employer]. He ended up majoring in history with biology and chemistry minors. He decided against medical school but is looking at law school. He has taken the LSAT and has put applications in. He hopes to pursue constitutional law. 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    There is no history of  neurologic disease, including stroke, seizure, TBI, migraine, or motoric dyscontrol. He has not had any ER visits in the last year.    There is no known family history of kidney disease or neurologic disease. Regarding mental health and the family, one of his sisters has had some depression issues.    BEHAVIORAL OBSERVATIONS    Mr. Delgadillo was polite and cooperative with the evaluation. Mood appeared to be euthymic, with mood-congruent affective display and a friendly, personable demeanor. He was a good historian and was open and candid in the interview. Thought processes were linear and goal directed.  Speech production was normal. Language comprehension was normal. There were no discernible cognitive abnormalities.     MEASURES ADMINISTERED    Cuello Depression Inventory-II; Cuello Anxiety Inventory; [place] Multiphasic Personality Slopzreln-7-DP.    RESULTS AND INTERPRETATION    On brief self-report inventories, he did not endorse any symptoms related to depression (BDI-II = 0) or anxiety (TRAM = 0).    His responses to a longer questionnaire for objective assessment of personality functioning and emotional coping patterns (MMPI-2-RF) were technically valid and interpretable, with no abnormalities among the embedded validity scales. There were also no abnormalities among any of the core clinical scales or various supplemental scales. The profile suggests a state of good psychological adjustment.    IMPRESSIONS AND RECOMMENDATIONS    Mr. Delgadillo demonstrates a good understanding of the medical aspects of liver donation including potential risks and recovery timeframe. He appears to have good psychosocial support from family and a reasonable postsurgical caregiving plan. There are no indications of cognitive limitations upon his ability to understand the procedure and to make informed, reasoned decisions in his own best interest. He appears to be a well thought out, altruistic donor.    He has a history of anxiety with  elements of panic that arose a couple of years ago. He sought out appropriate clinical treatments for the condition, and he successfully managed his symptoms. He is no longer taking psychiatric medications, and he feels good and stable off of them. He describes positive coping skills and a healthy lifestyle. The MMPI-2-RF profile is benign and suggests a state of good psychological adjustment. There is no history of substance abuse and no indications of risk for such.    Overall, I see no contraindications against moving forward with kidney donation candidacy, from a neuropsychological perspective.    Given the history of an anxiety disorder, it would be good to have a plan in place for regular check-ins and screenings of his emotional state after the surgery. I would encourage having a low threshold for resuming his prior mental health interventions, if the situation seems to worsen in any way.    Jose Brown, PhD, LP, ABPP-CN  Board Certified in Clinical Neuropsychology  Licensed Psychologist [id number]     Department of Rehabilitation Medicine  Division of Adult Neuropsychology  [facility]      Time spent: One unit psychiatric evaluation including interview, records review, clinical assessment, and report preparation by licensed and board-certified neuropsychologist (CPT 15424). One hour psychological testing evaluation services by licensed and board-certified neuropsychologist, including integration of patient data, interpretation of standardized test results and clinical data, clinical decision making, treatment planning and report and interactive feedback to the patient, family member(s) or caregiver(s), when performed; first hour (CPT 03157). 31 minutes psychological or neuropsychological test administration and scoring by technician (CPT 58291). Diagnoses: Z00.5

## 2020-10-02 NOTE — LETTER
10/2/2020         RE: Ari Delgadillo  [address] [place] [address]        Dear Colleague,    Thank you for referring your patient, Ari Delgadillo, to the [facility]. Please see a copy of my visit note below.    Donor Iohexol test    Ari Delgadillo presents today to [facility] for a Donor Iohexol test.      Progress note:  ID verified by name and .     The following information was verified with the patient:  Female Patients is there any possibility of being pregnant No  Is there a history of allergy (skin rash, swelling, ect) to:   A.  Iodine (except skin reactions to betadine): No   B. Intravenous radio-contrast agents: No   C. Seafood No     present during visit today: Not Applicable.    R.N. provided patient with educational handout regarding timed test. Yes    Iohexol administered over 2 minutes via butterfly  Positive blood return verified before and after injection.   20 gauge PIV placed in Lt AC  for blood draws and CT this afternoon.    Medication administered:  Iohexol (Omnipaque 300mg iodine/ml concentration) 5 mls.    Start time: 954  Stop time: 956    Drug Waste Record    Drug Name: Iohexol  Dose: 5 ml  Route administered: IV  NDC #: [id number]  Amount of waste(mL):5 ml  Reason for waste: Single use vial    Administrations This Visit     iohexol (OMNIPAQUE 300) injection 5 mL     Admin Date  10/02/2020 Action  Given Dose  5 mL Route  Intravenous Administered By  Marilyn Rubin RN                    Evaluation nurse in transplant to draw labs at 2 and 4 hours post iohexol administration.  Patient given a slip with the times to get labs drawn and verbalized understanding of the plan.    Patient tolerated the procedure:  Yes    After the infusion patient was discharged to the next appointment.    Marilyn Rubin RN            Again, thank you for allowing me to participate in the care of your patient.        Sincerely,        [facility]

## 2020-10-02 NOTE — PROGRESS NOTES
"Chief Complaint   Patient presents with     Transplant Donor Evaluation     kidney     Blood pressure 121/72, pulse 53, height 1.854 m (6' 1\"), weight 79.8 kg (176 lb), SpO2 98 %.      Donor in today for a three Blood Pressure assessment and vitals , each Blood Pressure is 15 minutes apart, patient given a free voucher for parking.      Hortencia Chan CMA     "

## 2020-10-05 LAB
CMV IGG SERPL QL IA: 7.4 AI (ref 0–0.8)
EBV VCA IGG SER QL IA: <0.2 AI (ref 0–0.8)
EBV VCA IGM SER QL IA: <0.2 AI (ref 0–0.8)
GAMMA INTERFERON BACKGROUND BLD IA-ACNC: 0.05 IU/ML
M TB IFN-G CD4+ BCKGRND COR BLD-ACNC: 9.95 IU/ML
M TB TUBERC IFN-G BLD QL: NEGATIVE
MITOGEN IGNF BCKGRD COR BLD-ACNC: 0 IU/ML
MITOGEN IGNF BCKGRD COR BLD-ACNC: 0 IU/ML

## 2020-10-06 LAB
BSA: 2.03 M2
IOHEXOL CL UR+SERPL-VRATE: 102 ML/MIN
IOHEXOL CL UR+SERPL-VRATE: 4.04 MG/DL
IOHEXOL CL UR+SERPL-VRATE: 8.3 MG/DL
IOHEXOL CL UR+SERPL-VRATE: 87 /1.73 M2

## 2020-10-07 ENCOUNTER — COMMITTEE REVIEW (OUTPATIENT)
Dept: TRANSPLANT | Facility: CLINIC | Age: 25
End: 2020-10-07

## 2020-10-07 NOTE — COMMITTEE REVIEW
Living Donor Committee Review Note Evaluation Date: 9/4/2020  Committee Review Date: 10/7/2020    Donor being evaluated for: Kidney    Transplant Phase: Evaluation  Transplant Status: Active    Transplant Coordinator: Rochelle Long  Transplant Surgeon:       Committee Review Members:  Immunology Forest Carlson, PhD   Nutrition Shania Engle, RD   Pharmacy John Bates, [facility]    - Clinical Bonnie Poe, [facility], Loni Ramesh, [facility]   Transplant Violet Madsen, FAREED, Daisha Hendrickson, FAREED, Rory Spears MD, Marlene Pacheco, FAREED, Myra Leyva, FAREED, Claudine Damon LPN, Rochelle Long RN, Yovani Garibay MD, Raysa Nguyen MD, MD       Transplant Eligibility: Acceptable Mental Health, Acceptable Physical Health, Pending UA and urine protein, CT review meeting patient is approved    Committee Review Decision: Approved    Relative Contraindications: None    Absolute Contraindications: None    Committee Chair Raysa Nguyen MD verbally attested to the committee's decision.    Committee Discussion Details: Reviewed all medical testing for donor evaluation.  Reviewed fasting glucose 101 okay, due to hemoglobin A1C normal.    Reviewed psychosocial assessment no concerns from Neuro psychology or Donor Social work/JANETH.  Urinalysis and urine protein/albumin tests were missed.  Patient is approved pending normal values of missing tests and review of CT at CT meeting.  Rochelle Long RN  Living Donor Coordinator  10/07/2020 2:25 PM     Addendum 10/8/2020:  CT Review Meeting Attended by Dr Jaya Caldera, Rochelle Long RN, Myra Leyva, FAREED and Leona Madsen RN:   Reviewed the CT images and the preliminary CT report.  The 3 Renal Arteries on Left are not transplantable, laterality is RIGHT Kidney.   Rochelle Long RN  Living Donor Coordinator  10/08/2020 5:06 PM

## 2020-10-16 DIAGNOSIS — Z00.5 TRANSPLANT DONOR EVALUATION: ICD-10-CM

## 2020-10-16 LAB
ALBUMIN UR-MCNC: NEGATIVE MG/DL
APPEARANCE UR: CLEAR
BILIRUB UR QL STRIP: NEGATIVE
COLOR UR AUTO: YELLOW
CREAT UR-MCNC: 118 MG/DL
GLUCOSE UR STRIP-MCNC: NEGATIVE MG/DL
HGB UR QL STRIP: NEGATIVE
KETONES UR STRIP-MCNC: NEGATIVE MG/DL
LEUKOCYTE ESTERASE UR QL STRIP: NEGATIVE
MICROALBUMIN UR-MCNC: <5 MG/L
MICROALBUMIN/CREAT UR: NORMAL MG/G CR (ref 0–17)
NITRATE UR QL: NEGATIVE
PH UR STRIP: 7 PH (ref 5–7)
PROT UR-MCNC: 0.14 G/L
PROT/CREAT 24H UR: 0.12 G/G CR (ref 0–0.2)
RBC #/AREA URNS AUTO: 0 /HPF (ref 0–2)
SOURCE: NORMAL
SP GR UR STRIP: 1.02 (ref 1–1.03)
UROBILINOGEN UR STRIP-MCNC: 0 MG/DL (ref 0–2)
WBC #/AREA URNS AUTO: 0 /HPF (ref 0–5)

## 2020-10-16 PROCEDURE — 81001 URINALYSIS AUTO W/SCOPE: CPT | Performed by: PATHOLOGY

## 2020-10-16 PROCEDURE — 82043 UR ALBUMIN QUANTITATIVE: CPT | Mod: 90 | Performed by: PATHOLOGY

## 2020-10-16 PROCEDURE — 84156 ASSAY OF PROTEIN URINE: CPT | Mod: 90 | Performed by: PATHOLOGY

## 2020-10-19 ENCOUNTER — DOCUMENTATION ONLY (OUTPATIENT)
Dept: TRANSPLANT | Facility: CLINIC | Age: 25
End: 2020-10-19

## 2020-10-19 DIAGNOSIS — Z00.5 EXAMINATION OF POTENTIAL DONOR OF ORGAN AND TISSUE: Primary | ICD-10-CM

## 2020-10-19 NOTE — PHARMACY-MEDICATION REGIMEN REVIEW
Pharmacy Living Kidney Donor Medication Evaluation     This patient is a 24 year old male being considered for living kidney donation. As part of the kidney pre-donation patient evaluation, pharmacy has screened this patient's electronic medical record for medication related concerns.    Assessment / Plan    No significant potential medication related issues are expected for this patient post surgery, based on the medical record medication list review.  Pharmacy will continue to participate in this patient's care throughout the surgery course.  Please contact pharmacy with any further medication related questions or concerns.       NÉSTOR Mendiola.Ph.  [facility]  [phone] pager

## 2020-10-20 ENCOUNTER — DOCUMENTATION ONLY (OUTPATIENT)
Dept: TRANSPLANT | Facility: CLINIC | Age: 25
End: 2020-10-20

## 2020-10-20 NOTE — PROGRESS NOTES
Sent Buccal Swab Kit to NDD Address  Fed-ex out [phone]  Return to Immunology  [phone].

## 2020-11-02 DIAGNOSIS — Z00.5 EXAMINATION OF POTENTIAL DONOR OF ORGAN AND TISSUE: ICD-10-CM

## 2020-11-05 ENCOUNTER — APPOINTMENT (OUTPATIENT)
Dept: LAB | Facility: CLINIC | Age: 25
End: 2020-11-05

## 2020-11-05 LAB
A* LOCUS: NORMAL
A*: NORMAL
ABTEST METHOD: NORMAL
B* LOCUS: NORMAL
B*: NORMAL
BW-1: NORMAL
BW-2: NORMAL
C* LOCUS: NORMAL
C*: NORMAL
DPA1* LOCUS NMDP: NORMAL
DPA1* NMDP: NORMAL
DPA1*: NORMAL
DPA1*LOCUS: NORMAL
DPB1* LOCUS NMDP: NORMAL
DPB1* NMDP: NORMAL
DPB1*: NORMAL
DPB1*LOCUS: NORMAL
DQA1*: NORMAL
DQA1*LOCUS: NORMAL
DQB1* LOCUS: NORMAL
DQB1*: NORMAL
DRB1* LOCUS: NORMAL
DRB1*: NORMAL
DRB3* LOCUS: NORMAL
DRB4*: NORMAL
DRSSO TEST METHOD: NORMAL

## 2020-11-05 PROCEDURE — 36415 COLL VENOUS BLD VENIPUNCTURE: CPT | Performed by: PATHOLOGY

## 2020-11-16 ENCOUNTER — TELEPHONE (OUTPATIENT)
Dept: TRANSPLANT | Facility: CLINIC | Age: 25
End: 2020-11-16

## 2020-11-18 ENCOUNTER — ORGAN (OUTPATIENT)
Dept: TRANSPLANT | Facility: CLINIC | Age: 25
End: 2020-11-18

## 2020-11-20 ENCOUNTER — TELEPHONE (OUTPATIENT)
Dept: TRANSPLANT | Facility: CLINIC | Age: 25
End: 2020-11-20

## 2020-11-20 NOTE — TELEPHONE ENCOUNTER
I reviewed the current match offer and plan for surgery on December 10, he is okay proceeding.  However he did have a covid test today as a precaution due to having a cough and change in taste.  He went to airport and had a saliva test.  He will update me as he finds out.  We had exchanged emails yesterday reviewing 14 day quarantine which would need to start on Thanksgiving.  He has decided to stay home and not join his family, but instead will zoom them.  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  He stated he does not know anyone in his inner [place] that is positive for covid so does not think he was necessarily exposed.

## 2020-11-23 DIAGNOSIS — Z00.5 EXAMINATION OF POTENTIAL DONOR OF ORGAN AND TISSUE: Primary | ICD-10-CM

## 2020-11-23 DIAGNOSIS — Z52.4 ENCOUNTER FOR DONATION OF KIDNEY: ICD-10-CM

## 2020-11-24 DIAGNOSIS — Z01.818 KIDNEY LIVING DONOR EVALUATION, PRE-OP: Primary | ICD-10-CM

## 2020-11-24 NOTE — PROGRESS NOTES
Day minus 5-10 pre op COVID order placed per SOT protocol, order will be linked to 12/1/20 lab appt once scheduled.

## 2020-11-27 ENCOUNTER — DOCUMENTATION ONLY (OUTPATIENT)
Dept: TRANSPLANT | Facility: CLINIC | Age: 25
End: 2020-11-27

## 2020-12-01 ENCOUNTER — OFFICE VISIT (OUTPATIENT)
Dept: TRANSPLANT | Facility: CLINIC | Age: 25
End: 2020-12-01
Attending: SURGERY

## 2020-12-01 ENCOUNTER — OFFICE VISIT (OUTPATIENT)
Dept: TRANSPLANT | Facility: CLINIC | Age: 25
End: 2020-12-01
Attending: NURSE PRACTITIONER

## 2020-12-01 VITALS
OXYGEN SATURATION: 99 % | HEIGHT: 73 IN | WEIGHT: 178.5 LBS | SYSTOLIC BLOOD PRESSURE: 117 MMHG | BODY MASS INDEX: 23.66 KG/M2 | DIASTOLIC BLOOD PRESSURE: 75 MMHG | TEMPERATURE: 97.4 F | HEART RATE: 57 BPM

## 2020-12-01 VITALS
BODY MASS INDEX: 23.67 KG/M2 | SYSTOLIC BLOOD PRESSURE: 117 MMHG | OXYGEN SATURATION: 99 % | HEART RATE: 57 BPM | HEIGHT: 73 IN | DIASTOLIC BLOOD PRESSURE: 75 MMHG | WEIGHT: 178.57 LBS

## 2020-12-01 DIAGNOSIS — Z00.5 EXAMINATION OF POTENTIAL DONOR OF ORGAN AND TISSUE: ICD-10-CM

## 2020-12-01 DIAGNOSIS — Z01.818 KIDNEY LIVING DONOR EVALUATION, PRE-OP: Primary | ICD-10-CM

## 2020-12-01 DIAGNOSIS — Z01.818 KIDNEY LIVING DONOR EVALUATION, PRE-OP: ICD-10-CM

## 2020-12-01 LAB
ABO + RH BLD: NORMAL
ABO + RH BLD: NORMAL
ALBUMIN UR-MCNC: NEGATIVE MG/DL
APPEARANCE UR: NORMAL
BILIRUB UR QL STRIP: NEGATIVE
BLD GP AB SCN SERPL QL: NORMAL
BLOOD BANK CMNT PATIENT-IMP: NORMAL
COLOR UR AUTO: YELLOW
CREAT SERPL-MCNC: 1.01 MG/DL (ref 0.66–1.25)
EBV VCA IGG SER QL IA: <0.2 AI (ref 0–0.8)
GFR SERPL CREATININE-BSD FRML MDRD: >90 ML/MIN/{1.73_M2}
GLUCOSE UR STRIP-MCNC: NEGATIVE MG/DL
HBV CORE AB SERPL QL IA: NONREACTIVE
HGB BLD-MCNC: 14.6 G/DL (ref 13.3–17.7)
HGB UR QL STRIP: NEGATIVE
KETONES UR STRIP-MCNC: NEGATIVE MG/DL
LABORATORY COMMENT REPORT: NORMAL
LEUKOCYTE ESTERASE UR QL STRIP: NEGATIVE
NITRATE UR QL: NEGATIVE
PH UR STRIP: 6 PH (ref 5–7)
RBC #/AREA URNS AUTO: <1 /HPF (ref 0–2)
SARS-COV-2 RNA SPEC QL NAA+PROBE: NEGATIVE
SARS-COV-2 RNA SPEC QL NAA+PROBE: NORMAL
SOURCE: NORMAL
SP GR UR STRIP: 1.03 (ref 1–1.03)
SPECIMEN EXP DATE BLD: NORMAL
SPECIMEN SOURCE: NORMAL
SPECIMEN SOURCE: NORMAL
UROBILINOGEN UR STRIP-MCNC: 0 MG/DL (ref 0–2)
WBC #/AREA URNS AUTO: 1 /HPF (ref 0–5)

## 2020-12-01 PROCEDURE — 86900 BLOOD TYPING SEROLOGIC ABO: CPT | Mod: 90 | Performed by: PATHOLOGY

## 2020-12-01 PROCEDURE — 86665 EPSTEIN-BARR CAPSID VCA: CPT | Mod: 90 | Performed by: PATHOLOGY

## 2020-12-01 PROCEDURE — 36415 COLL VENOUS BLD VENIPUNCTURE: CPT | Performed by: PATHOLOGY

## 2020-12-01 PROCEDURE — 99214 OFFICE O/P EST MOD 30 MIN: CPT | Performed by: SURGERY

## 2020-12-01 PROCEDURE — 86704 HEP B CORE ANTIBODY TOTAL: CPT | Mod: 90 | Performed by: PATHOLOGY

## 2020-12-01 PROCEDURE — 86850 RBC ANTIBODY SCREEN: CPT | Mod: 90 | Performed by: PATHOLOGY

## 2020-12-01 PROCEDURE — 82565 ASSAY OF CREATININE: CPT | Performed by: PATHOLOGY

## 2020-12-01 PROCEDURE — 81001 URINALYSIS AUTO W/SCOPE: CPT | Performed by: PATHOLOGY

## 2020-12-01 PROCEDURE — 85018 HEMOGLOBIN: CPT | Performed by: PATHOLOGY

## 2020-12-01 PROCEDURE — U0003 INFECTIOUS AGENT DETECTION BY NUCLEIC ACID (DNA OR RNA); SEVERE ACUTE RESPIRATORY SYNDROME CORONAVIRUS 2 (SARS-COV-2) (CORONAVIRUS DISEASE [COVID-19]), AMPLIFIED PROBE TECHNIQUE, MAKING USE OF HIGH THROUGHPUT TECHNOLOGIES AS DESCRIBED BY CMS-2020-01-R: HCPCS | Mod: 90 | Performed by: PATHOLOGY

## 2020-12-01 PROCEDURE — 86901 BLOOD TYPING SEROLOGIC RH(D): CPT | Mod: 90 | Performed by: PATHOLOGY

## 2020-12-01 ASSESSMENT — MIFFLIN-ST. JEOR
SCORE: 1858.55
SCORE: 1858.75

## 2020-12-01 ASSESSMENT — PAIN SCALES - GENERAL: PAINLEVEL: NO PAIN (0)

## 2020-12-01 NOTE — LETTER
12/1/2020         RE: Ari Delgadillo  [address] [place] [address]        Dear Colleague,    Thank you for referring your patient, Ari Delgadillo, to the [facility]ANSPLANT [facility]. Please see a copy of my visit note below.    Transplant Surgery H&P                                                        HPI:                                                      Mr. Delgadillo is a 24 year old male who comes to clinic today for preop prior to planned laparoscopic kidney donation surgery. The patient was previously reviewed by the living donor multidisciplinary selection committee and found to be medically and psychosocially appropriate for voluntary kidney donation. The patient denies any feelings of being pressured to proceed with kidney donation.  Health events since donor evaluation: None    Special considerations:   Constipation: no  PONV: no  History of Urinary retention? no  Significant neck/back/joint concerns for lateral decubitus positioning?: No  Yazdanism: No    MEDICAL HISTORY:                                                      Patient Active Problem List    Diagnosis Date Noted     Encounter for donation of kidney 11/23/2020     Priority: Medium     Added automatically from request for surgery [id number]       Transplant donor evaluation 09/04/2020     Priority: Medium     Anxiety      Priority: Medium     Male hypogonadism      Priority: Medium     Panic disorder      Priority: Medium      Past Medical History:   Diagnosis Date     Anxiety      Male hypogonadism      Panic disorder      Past Surgical History:   Procedure Laterality Date     NO HISTORY OF SURGERY       Current Outpatient Medications   Medication Sig Dispense Refill     loratadine (CLARITIN) 10 MG tablet As needed for allergies       escitalopram (LEXAPRO) 5 MG tablet        OTC products: None, except as noted above  No Known Allergies   Social History     Tobacco Use     Smoking status: Current Some Day Smoker     Types:  "Cigars     Smokeless tobacco: Never Used     Tobacco comment: Rare cigar use   Substance Use Topics     Alcohol use: Yes     Alcohol/week: 3.0 - 4.0 standard drinks     Types: 3 - 4 Standard drinks or equivalent per week     History   Drug Use Unknown       REVIEW OF SYSTEMS:                                                    CONSTITUTIONAL: NEGATIVE for fever, chills, change in weight  INTEGUMENTARY/SKIN: NEGATIVE for worrisome rashes, moles or lesions  EYES: NEGATIVE for vision changes or irritation  ENT/MOUTH: NEGATIVE for ear, mouth and throat problems  RESP: NEGATIVE for significant cough or SOB  BREAST: NEGATIVE for masses, tenderness or discharge  CV: NEGATIVE for chest pain, palpitations or peripheral edema  GI: NEGATIVE for nausea, abdominal pain, heartburn, or change in bowel habits  : NEGATIVE for frequency, dysuria, or hematuria  MUSCULOSKELETAL: NEGATIVE for significant arthralgias or myalgia  NEURO: NEGATIVE for weakness, dizziness or paresthesias  ENDOCRINE: NEGATIVE for temperature intolerance, skin/hair changes  HEME: NEGATIVE for bleeding problems  PSYCHIATRIC: NEGATIVE for changes in mood or affect    EXAM:                                                    /75   Pulse 57   Temp 97.4  F (36.3  C) (Oral)   Ht 1.854 m (6' 1\")   Wt 81 kg (178 lb 8 oz)   SpO2 99%   BMI 23.55 kg/m      GENERAL APPEARANCE: healthy, alert and no distress     EYES: EOMI, PERRL     HENT: ear canals and TM's normal and nose and mouth without ulcers or lesions     NECK: no adenopathy, no asymmetry, masses, or scars and thyroid normal to palpation     RESP: lungs clear to auscultation - no rales, rhonchi or wheezes     CV: regular rates and rhythm, normal S1 S2, no S3 or S4 and no murmur, click or rub     ABDOMEN:  soft, nontender, no HSM or masses and bowel sounds normal     MS: extremities normal- no gross deformities noted, no evidence of inflammation in joints, FROM in all extremities.     SKIN: no " suspicious lesions or rashes     NEURO: Normal strength and tone, sensory exam grossly normal, mentation intact and speech normal     PSYCH: mentation appears normal. and affect normal/bright     LYMPHATICS: No cervical adenopathy    DIAGNOSTICS:                                                      EKG: sinus bradycardia  Chest XRay  Labs Resulted Today:   Results for orders placed or performed in visit on 12/01/20   Asymptomatic COVID-19 Virus (Coronavirus) by PCR     Status: None    Specimen: Nasopharyngeal   Result Value Ref Range    COVID-19 Virus PCR to [facility] - Source Nasopharyngeal     COVID-19 Virus PCR to [facility] - Result       Test received-See reflex to IDDL test SARS CoV2 (COVID-19) Virus RT-PCR   Creatinine     Status: None   Result Value Ref Range    Creatinine 1.01 0.66 - 1.25 mg/dL    GFR Estimate >90 >60 mL/min/[1.73_m2]    GFR Estimate If Black >90 >60 mL/min/[1.73_m2]   Hemoglobin     Status: None   Result Value Ref Range    Hemoglobin 14.6 13.3 - 17.7 g/dL   UA with Microscopic reflex to Culture     Status: None    Specimen: Midstream Urine   Result Value Ref Range    Color Urine Yellow     Appearance Urine Slightly Cloudy     Glucose Urine Negative NEG^Negative mg/dL    Bilirubin Urine Negative NEG^Negative    Ketones Urine Negative NEG^Negative mg/dL    Specific Gravity Urine 1.026 1.003 - 1.035    Blood Urine Negative NEG^Negative    pH Urine 6.0 5.0 - 7.0 pH    Protein Albumin Urine Negative NEG^Negative mg/dL    Urobilinogen mg/dL 0.0 0.0 - 2.0 mg/dL    Nitrite Urine Negative NEG^Negative    Leukocyte Esterase Urine Negative NEG^Negative    Source Midstream Urine     WBC Urine 1 0 - 5 /HPF    RBC Urine <1 0 - 2 /HPF   SARS-CoV-2 COVID-19 Virus (Coronavirus) RT-PCR Nasopharyngeal     Status: None    Specimen: Nasopharyngeal   Result Value Ref Range    SARS-CoV-2 Virus Specimen Source Nasopharyngeal     SARS-CoV-2 PCR Result NEGATIVE     SARS-CoV-2 PCR Comment       Testing was performed  using the Xpert Xpress SARS-CoV-2 Assay on the Cepheid Gene-Xpert   Instrument Systems. Additional information about this Emergency Use Authorization (EUA)   assay can be found via the Lab Guide.     ABO/Rh type and screen     Status: None (In process)   Result Value Ref Range    ABO PENDING     Antibody Screen PENDING     Test Valid Only At          [facility]    Specimen Expires 12/04/2020      Labs Drawn and in Process:   Unresulted Labs Ordered in the Past 30 Days of this Admission     Date and Time Order Name Status Description    12/1/2020 0750 EBV CAPSID ANTIBODY IGG In process     12/1/2020 0750 ABO/RH TYPE AND SCREEN In process     12/1/2020 0750 HEPATITIS B CORE ANTIBODY In process         Recent Labs   Lab Test 12/01/20  0855 10/02/20  0927   HGB 14.6 14.9   PLT  --  226   INR  --  1.07   NA  --  140   POTASSIUM  --  4.0   CR 1.01 1.09   A1C  --  5.4      Assessment:                                                    Healthy voluntary kidney donor    There have been no significant intercurrent medical problems or change of intent in proceeding with kidney donation as scheduled on 12/10/2020 .    1. Labs reviewed and within normal limits: Yes  2. EKG (10/2/2020): sinus bradycardia  3. Paired Exchange case: Yes  4. ABO= PENDING  5. Laterality: right  6. Outstanding issues: COVID-19 testing, FINAL ABO and cross match     Plan:                                                      1. Consent: Done  2. Outstanding issues: COVID-19 testing, FINAL ABO and cross match       Signed Electronically by: Amara Vargas NP    Mr. Delgadillo was seen and evaluated today as part of a shared APRN/PA visit.     I personally reviewed past medical and surgical history, vital signs, medications and labs, present and past medical history,and significant physical exam findings with the advanced practice provider.    My key findings include:  Abdomen s/nt/nd.  No surgical scars.   Small 1cm umbilical hernia.    Key management decisions made by me and carried out under my direction include:  CT scan images and report reviewed.  Left kidney with 3 arteries and right kidney with 1 artery.  Will plan for right laparoscopic hand assisted donor nephrectomy.  Consent obtained.  Risks discussed.  Questions answered.    Raysa Nguyen MD FACS      Again, thank you for allowing me to participate in the care of your patient.        Sincerely,        Raysa Nguyen MD, MD

## 2020-12-01 NOTE — NURSING NOTE
"Chief Complaint   Patient presents with     Pre-Op Exam     Donor Day -9      Blood pressure 117/75, pulse 57, temperature 97.4  F (36.3  C), temperature source Oral, height 1.854 m (6' 1\"), weight 81 kg (178 lb 8 oz), SpO2 99 %.    Marisol Maria CMA    "

## 2020-12-01 NOTE — LETTER
12/1/2020         RE: Ari Delgadillo  [address] [place] [address]        Dear Colleague,    Thank you for referring your patient, Ari Delgadillo, to the [facility] TRANSPLANT [facility]. Please see a copy of my visit note below.    See surgeon note       Again, thank you for allowing me to participate in the care of your patient.        Sincerely,        Amara Vargas NP

## 2020-12-01 NOTE — PROGRESS NOTES
Transplant Surgery H&P                                                        HPI:                                                      Mr. Delgadillo is a 24 year old male who comes to clinic today for preop prior to planned laparoscopic kidney donation surgery. The patient was previously reviewed by the living donor multidisciplinary selection committee and found to be medically and psychosocially appropriate for voluntary kidney donation. The patient denies any feelings of being pressured to proceed with kidney donation.  Health events since donor evaluation: None    Special considerations:   Constipation: no  PONV: no  History of Urinary retention? no  Significant neck/back/joint concerns for lateral decubitus positioning?: No  Jew: No    MEDICAL HISTORY:                                                      Patient Active Problem List    Diagnosis Date Noted     Encounter for donation of kidney 11/23/2020     Priority: Medium     Added automatically from request for surgery [id number]       Transplant donor evaluation 09/04/2020     Priority: Medium     Anxiety      Priority: Medium     Male hypogonadism      Priority: Medium     Panic disorder      Priority: Medium      Past Medical History:   Diagnosis Date     Anxiety      Male hypogonadism      Panic disorder      Past Surgical History:   Procedure Laterality Date     NO HISTORY OF SURGERY       Current Outpatient Medications   Medication Sig Dispense Refill     loratadine (CLARITIN) 10 MG tablet As needed for allergies       escitalopram (LEXAPRO) 5 MG tablet        OTC products: None, except as noted above  No Known Allergies   Social History     Tobacco Use     Smoking status: Current Some Day Smoker     Types: Cigars     Smokeless tobacco: Never Used     Tobacco comment: Rare cigar use   Substance Use Topics     Alcohol use: Yes     Alcohol/week: 3.0 - 4.0 standard drinks     Types: 3 - 4 Standard drinks or equivalent per week     History   Drug  "Use Unknown       REVIEW OF SYSTEMS:                                                    CONSTITUTIONAL: NEGATIVE for fever, chills, change in weight  INTEGUMENTARY/SKIN: NEGATIVE for worrisome rashes, moles or lesions  EYES: NEGATIVE for vision changes or irritation  ENT/MOUTH: NEGATIVE for ear, mouth and throat problems  RESP: NEGATIVE for significant cough or SOB  BREAST: NEGATIVE for masses, tenderness or discharge  CV: NEGATIVE for chest pain, palpitations or peripheral edema  GI: NEGATIVE for nausea, abdominal pain, heartburn, or change in bowel habits  : NEGATIVE for frequency, dysuria, or hematuria  MUSCULOSKELETAL: NEGATIVE for significant arthralgias or myalgia  NEURO: NEGATIVE for weakness, dizziness or paresthesias  ENDOCRINE: NEGATIVE for temperature intolerance, skin/hair changes  HEME: NEGATIVE for bleeding problems  PSYCHIATRIC: NEGATIVE for changes in mood or affect    EXAM:                                                    /75   Pulse 57   Temp 97.4  F (36.3  C) (Oral)   Ht 1.854 m (6' 1\")   Wt 81 kg (178 lb 8 oz)   SpO2 99%   BMI 23.55 kg/m      GENERAL APPEARANCE: healthy, alert and no distress     EYES: EOMI, PERRL     HENT: ear canals and TM's normal and nose and mouth without ulcers or lesions     NECK: no adenopathy, no asymmetry, masses, or scars and thyroid normal to palpation     RESP: lungs clear to auscultation - no rales, rhonchi or wheezes     CV: regular rates and rhythm, normal S1 S2, no S3 or S4 and no murmur, click or rub     ABDOMEN:  soft, nontender, no HSM or masses and bowel sounds normal     MS: extremities normal- no gross deformities noted, no evidence of inflammation in joints, FROM in all extremities.     SKIN: no suspicious lesions or rashes     NEURO: Normal strength and tone, sensory exam grossly normal, mentation intact and speech normal     PSYCH: mentation appears normal. and affect normal/bright     LYMPHATICS: No cervical adenopathy    DIAGNOSTICS:    "                                                   EKG: sinus bradycardia  Chest XRay  Labs Resulted Today:   Results for orders placed or performed in visit on 12/01/20   Asymptomatic COVID-19 Virus (Coronavirus) by PCR     Status: None    Specimen: Nasopharyngeal   Result Value Ref Range    COVID-19 Virus PCR to [facility] - Source Nasopharyngeal     COVID-19 Virus PCR to [facility] - Result       Test received-See reflex to IDDL test SARS CoV2 (COVID-19) Virus RT-PCR   Creatinine     Status: None   Result Value Ref Range    Creatinine 1.01 0.66 - 1.25 mg/dL    GFR Estimate >90 >60 mL/min/[1.73_m2]    GFR Estimate If Black >90 >60 mL/min/[1.73_m2]   Hemoglobin     Status: None   Result Value Ref Range    Hemoglobin 14.6 13.3 - 17.7 g/dL   UA with Microscopic reflex to Culture     Status: None    Specimen: Midstream Urine   Result Value Ref Range    Color Urine Yellow     Appearance Urine Slightly Cloudy     Glucose Urine Negative NEG^Negative mg/dL    Bilirubin Urine Negative NEG^Negative    Ketones Urine Negative NEG^Negative mg/dL    Specific Gravity Urine 1.026 1.003 - 1.035    Blood Urine Negative NEG^Negative    pH Urine 6.0 5.0 - 7.0 pH    Protein Albumin Urine Negative NEG^Negative mg/dL    Urobilinogen mg/dL 0.0 0.0 - 2.0 mg/dL    Nitrite Urine Negative NEG^Negative    Leukocyte Esterase Urine Negative NEG^Negative    Source Midstream Urine     WBC Urine 1 0 - 5 /HPF    RBC Urine <1 0 - 2 /HPF   SARS-CoV-2 COVID-19 Virus (Coronavirus) RT-PCR Nasopharyngeal     Status: None    Specimen: Nasopharyngeal   Result Value Ref Range    SARS-CoV-2 Virus Specimen Source Nasopharyngeal     SARS-CoV-2 PCR Result NEGATIVE     SARS-CoV-2 PCR Comment       Testing was performed using the Xpert Xpress SARS-CoV-2 Assay on the Cepheid Gene-Xpert   Instrument Systems. Additional information about this Emergency Use Authorization (EUA)   assay can be found via the Lab Guide.     ABO/Rh type and screen     Status: None (In process)    Result Value Ref Range    ABO PENDING     Antibody Screen PENDING     Test Valid Only At          [facility]    Specimen Expires 12/04/2020      Labs Drawn and in Process:   Unresulted Labs Ordered in the Past 30 Days of this Admission     Date and Time Order Name Status Description    12/1/2020 0750 EBV CAPSID ANTIBODY IGG In process     12/1/2020 0750 ABO/RH TYPE AND SCREEN In process     12/1/2020 0750 HEPATITIS B CORE ANTIBODY In process         Recent Labs   Lab Test 12/01/20  0855 10/02/20  0927   HGB 14.6 14.9   PLT  --  226   INR  --  1.07   NA  --  140   POTASSIUM  --  4.0   CR 1.01 1.09   A1C  --  5.4      Assessment:                                                    Healthy voluntary kidney donor    There have been no significant intercurrent medical problems or change of intent in proceeding with kidney donation as scheduled on 12/10/2020 .    1. Labs reviewed and within normal limits: Yes  2. EKG (10/2/2020): sinus bradycardia  3. Paired Exchange case: Yes  4. ABO= PENDING  5. Laterality: right  6. Outstanding issues: COVID-19 testing, FINAL ABO and cross match     Plan:                                                      1. Consent: Done  2. Outstanding issues: COVID-19 testing, FINAL ABO and cross match       Signed Electronically by: Amara Vargas NP    Mr. Delgadillo was seen and evaluated today as part of a shared APRN/PA visit.     I personally reviewed past medical and surgical history, vital signs, medications and labs, present and past medical history,and significant physical exam findings with the advanced practice provider.    My key findings include:  Abdomen s/nt/nd.  No surgical scars.  Small 1cm umbilical hernia.    Key management decisions made by me and carried out under my direction include:  CT scan images and report reviewed.  Left kidney with 3 arteries and right kidney with 1 or possibly 2 arteries.  It's hard to tell from the  images but will be on the look out for a second artery and will preserve if it's there.  Will plan for right laparoscopic hand assisted donor nephrectomy.  Consent obtained.  Risks discussed.  Questions answered.    Raysa Nguyen MD FACS

## 2020-12-02 DIAGNOSIS — Z01.818 KIDNEY LIVING DONOR EVALUATION, PRE-OP: Primary | ICD-10-CM

## 2020-12-02 NOTE — PROGRESS NOTES
Day minus 1-2 pre op COVID order placed per SOT protocol, order linked to existing appt on 12/8/20.

## 2020-12-08 ENCOUNTER — ANCILLARY PROCEDURE (OUTPATIENT)
Dept: GENERAL RADIOLOGY | Facility: CLINIC | Age: 25
End: 2020-12-08
Attending: SURGERY

## 2020-12-08 ENCOUNTER — APPOINTMENT (OUTPATIENT)
Dept: LAB | Facility: CLINIC | Age: 25
End: 2020-12-08

## 2020-12-08 DIAGNOSIS — Z01.818 KIDNEY LIVING DONOR EVALUATION, PRE-OP: ICD-10-CM

## 2020-12-08 DIAGNOSIS — Z00.5 EXAMINATION OF POTENTIAL DONOR OF ORGAN AND TISSUE: ICD-10-CM

## 2020-12-08 LAB
LABORATORY COMMENT REPORT: NORMAL
SARS-COV-2 RNA SPEC QL NAA+PROBE: NEGATIVE
SARS-COV-2 RNA SPEC QL NAA+PROBE: NORMAL
SPECIMEN SOURCE: NORMAL
SPECIMEN SOURCE: NORMAL

## 2020-12-08 PROCEDURE — U0003 INFECTIOUS AGENT DETECTION BY NUCLEIC ACID (DNA OR RNA); SEVERE ACUTE RESPIRATORY SYNDROME CORONAVIRUS 2 (SARS-COV-2) (CORONAVIRUS DISEASE [COVID-19]), AMPLIFIED PROBE TECHNIQUE, MAKING USE OF HIGH THROUGHPUT TECHNOLOGIES AS DESCRIBED BY CMS-2020-01-R: HCPCS | Mod: 90 | Performed by: PATHOLOGY

## 2020-12-08 PROCEDURE — 71046 X-RAY EXAM CHEST 2 VIEWS: CPT | Mod: GC | Performed by: RADIOLOGY

## 2020-12-09 ENCOUNTER — ANESTHESIA EVENT (OUTPATIENT)
Dept: SURGERY | Facility: CLINIC | Age: 25
DRG: 661 | End: 2020-12-09
Payer: COMMERCIAL

## 2020-12-09 NOTE — ANESTHESIA PREPROCEDURE EVALUATION
"Anesthesia Pre-Procedure Evaluation    Patient: Ari Delgadillo   MRN:     [id number] Gender:   male   Age:    24 year old :      1995        Preoperative Diagnosis: Encounter for donation of kidney [Z52.4]   Procedure(s):  Laparoscopic Hand Assisted Living Non-Directed Kidney Donor     LABS:  CBC:   Lab Results   Component Value Date    WBC 4.5 10/02/2020    HGB 14.6 2020    HGB 14.9 10/02/2020    HCT 44.1 10/02/2020     10/02/2020     BMP:   Lab Results   Component Value Date     10/02/2020    POTASSIUM 4.0 10/02/2020    CHLORIDE 104 10/02/2020    CO2 29 10/02/2020    BUN 17 10/02/2020    CR 1.01 2020    CR 1.09 10/02/2020     (H) 10/02/2020    GLC 91 2020     COAGS:   Lab Results   Component Value Date    PTT 30 10/02/2020    INR 1.07 10/02/2020     POC: No results found for: BGM, HCG, HCGS  OTHER:   Lab Results   Component Value Date    A1C 5.4 10/02/2020    DAMIAN 9.4 10/02/2020    PHOS 4.0 10/02/2020    ALBUMIN 4.2 10/02/2020    PROTTOTAL 7.7 10/02/2020    ALT 36 10/02/2020    AST 24 10/02/2020    ALKPHOS 68 10/02/2020    BILITOTAL 0.9 10/02/2020        Preop Vitals    BP Readings from Last 3 Encounters:   20 117/75   20 117/75   10/02/20 121/72    Pulse Readings from Last 3 Encounters:   20 57   20 57   10/02/20 53      Resp Readings from Last 3 Encounters:   No data found for Resp    SpO2 Readings from Last 3 Encounters:   20 99%   20 99%   10/02/20 98%      Temp Readings from Last 1 Encounters:   20 36.3  C (97.4  F) (Oral)    Ht Readings from Last 1 Encounters:   20 1.854 m (6' 1\")      Wt Readings from Last 1 Encounters:   20 81 kg (178 lb 8 oz)    Estimated body mass index is 23.56 kg/m  as calculated from the following:    Height as of 20: 1.854 m (6' 0.99\").    Weight as of 20: 81 kg (178 lb 9.2 oz).     LDA:        Past Medical History:   Diagnosis Date     Anxiety      Male hypogonadism      Panic " disorder       Past Surgical History:   Procedure Laterality Date     NO HISTORY OF SURGERY        No Known Allergies     Anesthesia Evaluation     .             ROS/MED HX    ENT/Pulmonary:  - neg pulmonary ROS     Neurologic:  - neg neurologic ROS     Cardiovascular:  - neg cardiovascular ROS       METS/Exercise Tolerance:     Hematologic:  - neg hematologic  ROS       Musculoskeletal:  - neg musculoskeletal ROS       GI/Hepatic:         Renal/Genitourinary: Comment: Male hypogonadism        Endo:  - neg endo ROS       Psychiatric:     (+) psychiatric history anxiety      Infectious Disease:  - neg infectious disease ROS       Malignancy:      - no malignancy   Other:                     JZG FV AN PHYSICAL EXAM    Assessment:   ASA SCORE: 1      Smoking Status:  Active Smoker   NPO Status: NPO Appropriate     Plan:   Anes. Type:  General   Pre-Medication: None   Induction:  IV (Standard)   Airway: ETT; Oral   Access/Monitoring: PIV   Maintenance: Balanced     Postop Plan:   Postop Pain: Opioids  Postop Sedation/Airway: Not planned     PONV Management:   Adult Risk Factors:, Postop Opioids   Prevention: Ondansetron, Dexamethasone     CONSENT: Direct conversation   Plan and risks discussed with: Patient   Blood Products: Consented (ALL Blood Products)                   Pee Morgan MD

## 2020-12-10 ENCOUNTER — ANESTHESIA (OUTPATIENT)
Dept: SURGERY | Facility: CLINIC | Age: 25
DRG: 661 | End: 2020-12-10
Payer: COMMERCIAL

## 2020-12-10 ENCOUNTER — HOSPITAL ENCOUNTER (INPATIENT)
Facility: CLINIC | Age: 25
LOS: 1 days | Discharge: HOME | DRG: 661 | End: 2020-12-11
Attending: SURGERY | Admitting: SURGERY
Payer: COMMERCIAL

## 2020-12-10 ENCOUNTER — ANCILLARY PROCEDURE (OUTPATIENT)
Dept: ULTRASOUND IMAGING | Facility: CLINIC | Age: 25
End: 2020-12-10

## 2020-12-10 DIAGNOSIS — Z52.4 ENCOUNTER FOR DONATION OF KIDNEY: ICD-10-CM

## 2020-12-10 LAB
CK SERPL-CCNC: 191 U/L (ref 30–300)
GLUCOSE BLDC GLUCOMTR-MCNC: 120 MG/DL (ref 70–99)
HCT VFR BLD AUTO: 37 % (ref 40–53)
HGB BLD-MCNC: 12.8 G/DL (ref 13.3–17.7)
MISCELLANEOUS TEST: NORMAL

## 2020-12-10 PROCEDURE — 250N000011 HC RX IP 250 OP 636: Performed by: SURGERY

## 2020-12-10 PROCEDURE — 250N000013 HC RX MED GY IP 250 OP 250 PS 637: Performed by: NURSE PRACTITIONER

## 2020-12-10 PROCEDURE — 999N001017 HC STATISTIC GLUCOSE BY METER IP

## 2020-12-10 PROCEDURE — 258N000003 HC RX IP 258 OP 636: Performed by: SURGERY

## 2020-12-10 PROCEDURE — 360N000028 HC SURGERY LEVEL 4 1ST 30 MIN - UMMC: Performed by: SURGERY

## 2020-12-10 PROCEDURE — 50547 LAPARO REMOVAL DONOR KIDNEY: CPT | Performed by: SURGERY

## 2020-12-10 PROCEDURE — 250N000009 HC RX 250

## 2020-12-10 PROCEDURE — 250N000011 HC RX IP 250 OP 636: Performed by: NURSE PRACTITIONER

## 2020-12-10 PROCEDURE — 360N000029 HC SURGERY LEVEL 4 EA 15 ADDTL MIN - UMMC: Performed by: SURGERY

## 2020-12-10 PROCEDURE — 370N000001 HC ANESTHESIA TECHNICAL FEE, 1ST 30 MIN: Performed by: SURGERY

## 2020-12-10 PROCEDURE — 272N000001 HC OR GENERAL SUPPLY STERILE: Performed by: SURGERY

## 2020-12-10 PROCEDURE — 250N000011 HC RX IP 250 OP 636

## 2020-12-10 PROCEDURE — 36415 COLL VENOUS BLD VENIPUNCTURE: CPT | Performed by: SURGERY

## 2020-12-10 PROCEDURE — 82550 ASSAY OF CK (CPK): CPT | Performed by: SURGERY

## 2020-12-10 PROCEDURE — 250N000003 HC SEVOFLURANE, EA 15 MIN: Performed by: SURGERY

## 2020-12-10 PROCEDURE — 85014 HEMATOCRIT: CPT | Performed by: SURGERY

## 2020-12-10 PROCEDURE — 250N000011 HC RX IP 250 OP 636: Performed by: ANESTHESIOLOGY

## 2020-12-10 PROCEDURE — 85018 HEMOGLOBIN: CPT | Performed by: SURGERY

## 2020-12-10 PROCEDURE — 250N000013 HC RX MED GY IP 250 OP 250 PS 637: Performed by: SURGERY

## 2020-12-10 PROCEDURE — 250N000011 HC RX IP 250 OP 636: Performed by: STUDENT IN AN ORGANIZED HEALTH CARE EDUCATION/TRAINING PROGRAM

## 2020-12-10 PROCEDURE — 999N000139 HC STATISTIC PRE-PROCEDURE ASSESSMENT II: Performed by: SURGERY

## 2020-12-10 PROCEDURE — 258N000003 HC RX IP 258 OP 636

## 2020-12-10 PROCEDURE — C9290 INJ, BUPIVACAINE LIPOSOME: HCPCS | Performed by: STUDENT IN AN ORGANIZED HEALTH CARE EDUCATION/TRAINING PROGRAM

## 2020-12-10 PROCEDURE — 761N000003 HC RECOVERY PHASE 1 LEVEL 2 FIRST HR: Performed by: SURGERY

## 2020-12-10 PROCEDURE — 120N000011 HC R&B TRANSPLANT UMMC

## 2020-12-10 PROCEDURE — 370N000002 HC ANESTHESIA TECHNICAL FEE, EACH ADDTL 15 MIN: Performed by: SURGERY

## 2020-12-10 PROCEDURE — 0TT00ZZ RESECTION OF RIGHT KIDNEY, OPEN APPROACH: ICD-10-PCS | Performed by: SURGERY

## 2020-12-10 RX ORDER — ALBUMIN, HUMAN INJ 5% 5 %
250 SOLUTION INTRAVENOUS EVERY 10 MIN PRN
Status: DISCONTINUED | OUTPATIENT
Start: 2020-12-10 | End: 2020-12-10 | Stop reason: HOSPADM

## 2020-12-10 RX ORDER — PROTAMINE SULFATE 10 MG/ML
50 INJECTION, SOLUTION INTRAVENOUS ONCE
Status: COMPLETED | OUTPATIENT
Start: 2020-12-10 | End: 2020-12-10

## 2020-12-10 RX ORDER — BUPIVACAINE HYDROCHLORIDE 2.5 MG/ML
INJECTION, SOLUTION EPIDURAL; INFILTRATION; INTRACAUDAL PRN
Status: DISCONTINUED | OUTPATIENT
Start: 2020-12-10 | End: 2020-12-10

## 2020-12-10 RX ORDER — FENTANYL CITRATE 50 UG/ML
10-20 INJECTION, SOLUTION INTRAMUSCULAR; INTRAVENOUS
Status: DISCONTINUED | OUTPATIENT
Start: 2020-12-10 | End: 2020-12-11

## 2020-12-10 RX ORDER — NALOXONE HYDROCHLORIDE 0.4 MG/ML
0.4 INJECTION, SOLUTION INTRAMUSCULAR; INTRAVENOUS; SUBCUTANEOUS
Status: ACTIVE | OUTPATIENT
Start: 2020-12-10 | End: 2020-12-11

## 2020-12-10 RX ORDER — HEPARIN SODIUM 1000 [USP'U]/ML
5000 INJECTION, SOLUTION INTRAVENOUS; SUBCUTANEOUS ONCE
Status: COMPLETED | OUTPATIENT
Start: 2020-12-10 | End: 2020-12-10

## 2020-12-10 RX ORDER — NALOXONE HYDROCHLORIDE 0.4 MG/ML
0.4 INJECTION, SOLUTION INTRAMUSCULAR; INTRAVENOUS; SUBCUTANEOUS
Status: DISCONTINUED | OUTPATIENT
Start: 2020-12-10 | End: 2020-12-10 | Stop reason: HOSPADM

## 2020-12-10 RX ORDER — ONDANSETRON 4 MG/1
4 TABLET, ORALLY DISINTEGRATING ORAL EVERY 6 HOURS PRN
Status: DISCONTINUED | OUTPATIENT
Start: 2020-12-10 | End: 2020-12-11 | Stop reason: HOSPADM

## 2020-12-10 RX ORDER — SODIUM CHLORIDE, SODIUM LACTATE, POTASSIUM CHLORIDE, CALCIUM CHLORIDE 600; 310; 30; 20 MG/100ML; MG/100ML; MG/100ML; MG/100ML
INJECTION, SOLUTION INTRAVENOUS CONTINUOUS PRN
Status: DISCONTINUED | OUTPATIENT
Start: 2020-12-10 | End: 2020-12-10

## 2020-12-10 RX ORDER — HYDROMORPHONE HYDROCHLORIDE 1 MG/ML
.3-.5 INJECTION, SOLUTION INTRAMUSCULAR; INTRAVENOUS; SUBCUTANEOUS EVERY 5 MIN PRN
Status: DISCONTINUED | OUTPATIENT
Start: 2020-12-10 | End: 2020-12-10 | Stop reason: HOSPADM

## 2020-12-10 RX ORDER — AMOXICILLIN 250 MG
2 CAPSULE ORAL 2 TIMES DAILY
Status: DISCONTINUED | OUTPATIENT
Start: 2020-12-10 | End: 2020-12-11 | Stop reason: HOSPADM

## 2020-12-10 RX ORDER — DEXAMETHASONE SODIUM PHOSPHATE 4 MG/ML
INJECTION, SOLUTION INTRA-ARTICULAR; INTRALESIONAL; INTRAMUSCULAR; INTRAVENOUS; SOFT TISSUE PRN
Status: DISCONTINUED | OUTPATIENT
Start: 2020-12-10 | End: 2020-12-10

## 2020-12-10 RX ORDER — MANNITOL 250 MG/ML
INJECTION, SOLUTION INTRAVENOUS PRN
Status: DISCONTINUED | OUTPATIENT
Start: 2020-12-10 | End: 2020-12-10

## 2020-12-10 RX ORDER — KETOROLAC TROMETHAMINE 15 MG/ML
15 INJECTION, SOLUTION INTRAMUSCULAR; INTRAVENOUS EVERY 8 HOURS
Status: DISCONTINUED | OUTPATIENT
Start: 2020-12-10 | End: 2020-12-11 | Stop reason: HOSPADM

## 2020-12-10 RX ORDER — GABAPENTIN 300 MG/1
300 CAPSULE ORAL ONCE
Status: COMPLETED | OUTPATIENT
Start: 2020-12-10 | End: 2020-12-10

## 2020-12-10 RX ORDER — EPHEDRINE SULFATE 50 MG/ML
INJECTION, SOLUTION INTRAMUSCULAR; INTRAVENOUS; SUBCUTANEOUS PRN
Status: DISCONTINUED | OUTPATIENT
Start: 2020-12-10 | End: 2020-12-10

## 2020-12-10 RX ORDER — ONDANSETRON 2 MG/ML
4 INJECTION INTRAMUSCULAR; INTRAVENOUS EVERY 6 HOURS PRN
Status: DISCONTINUED | OUTPATIENT
Start: 2020-12-10 | End: 2020-12-11 | Stop reason: HOSPADM

## 2020-12-10 RX ORDER — FENTANYL CITRATE 50 UG/ML
INJECTION, SOLUTION INTRAMUSCULAR; INTRAVENOUS PRN
Status: DISCONTINUED | OUTPATIENT
Start: 2020-12-10 | End: 2020-12-10

## 2020-12-10 RX ORDER — NALOXONE HYDROCHLORIDE 0.4 MG/ML
0.2 INJECTION, SOLUTION INTRAMUSCULAR; INTRAVENOUS; SUBCUTANEOUS
Status: ACTIVE | OUTPATIENT
Start: 2020-12-10 | End: 2020-12-11

## 2020-12-10 RX ORDER — LIDOCAINE HYDROCHLORIDE 20 MG/ML
INJECTION, SOLUTION INFILTRATION; PERINEURAL PRN
Status: DISCONTINUED | OUTPATIENT
Start: 2020-12-10 | End: 2020-12-10

## 2020-12-10 RX ORDER — NALOXONE HYDROCHLORIDE 0.4 MG/ML
0.2 INJECTION, SOLUTION INTRAMUSCULAR; INTRAVENOUS; SUBCUTANEOUS
Status: DISCONTINUED | OUTPATIENT
Start: 2020-12-10 | End: 2020-12-10 | Stop reason: HOSPADM

## 2020-12-10 RX ORDER — BUPIVACAINE HYDROCHLORIDE 2.5 MG/ML
INJECTION, SOLUTION EPIDURAL; INFILTRATION; INTRACAUDAL PRN
Status: DISCONTINUED | OUTPATIENT
Start: 2020-12-10 | End: 2020-12-10 | Stop reason: HOSPADM

## 2020-12-10 RX ORDER — FLUMAZENIL 0.1 MG/ML
0.2 INJECTION, SOLUTION INTRAVENOUS
Status: DISCONTINUED | OUTPATIENT
Start: 2020-12-10 | End: 2020-12-10 | Stop reason: HOSPADM

## 2020-12-10 RX ORDER — CEFUROXIME SODIUM 1.5 G/16ML
1.5 INJECTION, POWDER, FOR SOLUTION INTRAVENOUS
Status: DISCONTINUED | OUTPATIENT
Start: 2020-12-10 | End: 2020-12-10 | Stop reason: HOSPADM

## 2020-12-10 RX ORDER — FENTANYL CITRATE 50 UG/ML
25-50 INJECTION, SOLUTION INTRAMUSCULAR; INTRAVENOUS
Status: DISCONTINUED | OUTPATIENT
Start: 2020-12-10 | End: 2020-12-10 | Stop reason: HOSPADM

## 2020-12-10 RX ORDER — OXYCODONE HYDROCHLORIDE 5 MG/1
5-10 TABLET ORAL
Status: DISCONTINUED | OUTPATIENT
Start: 2020-12-10 | End: 2020-12-11

## 2020-12-10 RX ORDER — OXYCODONE HYDROCHLORIDE 5 MG/1
5 TABLET ORAL EVERY 4 HOURS PRN
Status: DISCONTINUED | OUTPATIENT
Start: 2020-12-10 | End: 2020-12-10

## 2020-12-10 RX ORDER — FUROSEMIDE 10 MG/ML
INJECTION INTRAMUSCULAR; INTRAVENOUS PRN
Status: DISCONTINUED | OUTPATIENT
Start: 2020-12-10 | End: 2020-12-10

## 2020-12-10 RX ORDER — ONDANSETRON 2 MG/ML
4 INJECTION INTRAMUSCULAR; INTRAVENOUS ONCE
Status: DISCONTINUED | OUTPATIENT
Start: 2020-12-10 | End: 2020-12-10

## 2020-12-10 RX ORDER — SODIUM CHLORIDE, SODIUM LACTATE, POTASSIUM CHLORIDE, CALCIUM CHLORIDE 600; 310; 30; 20 MG/100ML; MG/100ML; MG/100ML; MG/100ML
1-3 INJECTION, SOLUTION INTRAVENOUS CONTINUOUS
Status: DISCONTINUED | OUTPATIENT
Start: 2020-12-10 | End: 2020-12-10 | Stop reason: HOSPADM

## 2020-12-10 RX ORDER — ONDANSETRON 4 MG/1
4 TABLET, ORALLY DISINTEGRATING ORAL EVERY 30 MIN PRN
Status: DISCONTINUED | OUTPATIENT
Start: 2020-12-10 | End: 2020-12-10 | Stop reason: HOSPADM

## 2020-12-10 RX ORDER — ONDANSETRON 2 MG/ML
INJECTION INTRAMUSCULAR; INTRAVENOUS PRN
Status: DISCONTINUED | OUTPATIENT
Start: 2020-12-10 | End: 2020-12-10

## 2020-12-10 RX ORDER — AMOXICILLIN 250 MG
1 CAPSULE ORAL 2 TIMES DAILY
Status: DISCONTINUED | OUTPATIENT
Start: 2020-12-10 | End: 2020-12-11 | Stop reason: HOSPADM

## 2020-12-10 RX ORDER — PROPOFOL 10 MG/ML
INJECTION, EMULSION INTRAVENOUS PRN
Status: DISCONTINUED | OUTPATIENT
Start: 2020-12-10 | End: 2020-12-10

## 2020-12-10 RX ORDER — CEFUROXIME SODIUM 1.5 G/16ML
1.5 INJECTION, POWDER, FOR SOLUTION INTRAVENOUS
Status: COMPLETED | OUTPATIENT
Start: 2020-12-10 | End: 2020-12-10

## 2020-12-10 RX ORDER — KETOROLAC TROMETHAMINE 30 MG/ML
15 INJECTION, SOLUTION INTRAMUSCULAR; INTRAVENOUS ONCE
Status: DISCONTINUED | OUTPATIENT
Start: 2020-12-10 | End: 2020-12-10 | Stop reason: HOSPADM

## 2020-12-10 RX ORDER — SODIUM CHLORIDE, SODIUM LACTATE, POTASSIUM CHLORIDE, CALCIUM CHLORIDE 600; 310; 30; 20 MG/100ML; MG/100ML; MG/100ML; MG/100ML
INJECTION, SOLUTION INTRAVENOUS CONTINUOUS
Status: DISCONTINUED | OUTPATIENT
Start: 2020-12-10 | End: 2020-12-11

## 2020-12-10 RX ORDER — ONDANSETRON 2 MG/ML
4 INJECTION INTRAMUSCULAR; INTRAVENOUS ONCE
Status: COMPLETED | OUTPATIENT
Start: 2020-12-10 | End: 2020-12-10

## 2020-12-10 RX ORDER — DEXAMETHASONE SODIUM PHOSPHATE 4 MG/ML
8 INJECTION, SOLUTION INTRA-ARTICULAR; INTRALESIONAL; INTRAMUSCULAR; INTRAVENOUS; SOFT TISSUE ONCE
Status: DISCONTINUED | OUTPATIENT
Start: 2020-12-10 | End: 2020-12-10 | Stop reason: HOSPADM

## 2020-12-10 RX ORDER — BISACODYL 10 MG
10 SUPPOSITORY, RECTAL RECTAL DAILY PRN
Status: DISCONTINUED | OUTPATIENT
Start: 2020-12-10 | End: 2020-12-11 | Stop reason: HOSPADM

## 2020-12-10 RX ORDER — ACETAMINOPHEN 325 MG/1
650 TABLET ORAL EVERY 4 HOURS
Status: DISCONTINUED | OUTPATIENT
Start: 2020-12-10 | End: 2020-12-11 | Stop reason: HOSPADM

## 2020-12-10 RX ORDER — ACETAMINOPHEN 325 MG/1
975 TABLET ORAL ONCE
Status: COMPLETED | OUTPATIENT
Start: 2020-12-10 | End: 2020-12-10

## 2020-12-10 RX ORDER — PROCHLORPERAZINE MALEATE 5 MG
10 TABLET ORAL EVERY 6 HOURS PRN
Status: DISCONTINUED | OUTPATIENT
Start: 2020-12-10 | End: 2020-12-11 | Stop reason: HOSPADM

## 2020-12-10 RX ORDER — ONDANSETRON 2 MG/ML
4 INJECTION INTRAMUSCULAR; INTRAVENOUS EVERY 30 MIN PRN
Status: DISCONTINUED | OUTPATIENT
Start: 2020-12-10 | End: 2020-12-10 | Stop reason: HOSPADM

## 2020-12-10 RX ORDER — MAGNESIUM SULFATE HEPTAHYDRATE 40 MG/ML
2 INJECTION, SOLUTION INTRAVENOUS ONCE
Status: COMPLETED | OUTPATIENT
Start: 2020-12-10 | End: 2020-12-10

## 2020-12-10 RX ORDER — SODIUM CHLORIDE, SODIUM LACTATE, POTASSIUM CHLORIDE, CALCIUM CHLORIDE 600; 310; 30; 20 MG/100ML; MG/100ML; MG/100ML; MG/100ML
INJECTION, SOLUTION INTRAVENOUS CONTINUOUS
Status: DISCONTINUED | OUTPATIENT
Start: 2020-12-10 | End: 2020-12-10 | Stop reason: HOSPADM

## 2020-12-10 RX ORDER — FAMOTIDINE 20 MG/1
20 TABLET, FILM COATED ORAL DAILY
Status: DISCONTINUED | OUTPATIENT
Start: 2020-12-10 | End: 2020-12-11 | Stop reason: HOSPADM

## 2020-12-10 RX ADMIN — CEFUROXIME 1.5 G: 1.5 INJECTION, POWDER, FOR SOLUTION INTRAVENOUS at 09:52

## 2020-12-10 RX ADMIN — ROCURONIUM BROMIDE 20 MG: 10 INJECTION INTRAVENOUS at 10:00

## 2020-12-10 RX ADMIN — CEFUROXIME 1.5 G: 1.5 INJECTION, POWDER, FOR SOLUTION INTRAVENOUS at 11:50

## 2020-12-10 RX ADMIN — Medication 5 MG: at 08:00

## 2020-12-10 RX ADMIN — FENTANYL CITRATE 50 MCG: 50 INJECTION, SOLUTION INTRAMUSCULAR; INTRAVENOUS at 13:28

## 2020-12-10 RX ADMIN — ACETAMINOPHEN 650 MG: 325 TABLET, FILM COATED ORAL at 19:34

## 2020-12-10 RX ADMIN — ROCURONIUM BROMIDE 20 MG: 10 INJECTION INTRAVENOUS at 11:28

## 2020-12-10 RX ADMIN — SODIUM CHLORIDE, POTASSIUM CHLORIDE, SODIUM LACTATE AND CALCIUM CHLORIDE: 600; 310; 30; 20 INJECTION, SOLUTION INTRAVENOUS at 07:51

## 2020-12-10 RX ADMIN — MANNITOL 12.5 G: 12.5 INJECTION, SOLUTION INTRAVENOUS at 10:34

## 2020-12-10 RX ADMIN — ROCURONIUM BROMIDE 20 MG: 10 INJECTION INTRAVENOUS at 08:13

## 2020-12-10 RX ADMIN — FENTANYL CITRATE 100 MCG: 50 INJECTION, SOLUTION INTRAMUSCULAR; INTRAVENOUS at 07:41

## 2020-12-10 RX ADMIN — DEXAMETHASONE SODIUM PHOSPHATE 8 MG: 4 INJECTION, SOLUTION INTRA-ARTICULAR; INTRALESIONAL; INTRAMUSCULAR; INTRAVENOUS; SOFT TISSUE at 07:50

## 2020-12-10 RX ADMIN — PHENYLEPHRINE HYDROCHLORIDE 200 MCG: 10 INJECTION INTRAVENOUS at 12:47

## 2020-12-10 RX ADMIN — LIDOCAINE HYDROCHLORIDE 100 MG: 20 INJECTION, SOLUTION INFILTRATION; PERINEURAL at 07:41

## 2020-12-10 RX ADMIN — ROCURONIUM BROMIDE 20 MG: 10 INJECTION INTRAVENOUS at 10:31

## 2020-12-10 RX ADMIN — FENTANYL CITRATE 50 MCG: 50 INJECTION, SOLUTION INTRAMUSCULAR; INTRAVENOUS at 13:47

## 2020-12-10 RX ADMIN — ACETAMINOPHEN 975 MG: 325 TABLET, FILM COATED ORAL at 06:13

## 2020-12-10 RX ADMIN — FAMOTIDINE 20 MG: 20 TABLET ORAL at 15:04

## 2020-12-10 RX ADMIN — PHENYLEPHRINE HYDROCHLORIDE 100 MCG: 10 INJECTION INTRAVENOUS at 11:46

## 2020-12-10 RX ADMIN — MAGNESIUM SULFATE IN WATER 2 G: 40 INJECTION, SOLUTION INTRAVENOUS at 07:51

## 2020-12-10 RX ADMIN — PROTAMINE SULFATE 50 MG: 10 INJECTION, SOLUTION INTRAVENOUS at 11:21

## 2020-12-10 RX ADMIN — BUPIVACAINE HYDROCHLORIDE 20 ML: 2.5 INJECTION, SOLUTION EPIDURAL; INFILTRATION; INTRACAUDAL; PERINEURAL at 06:49

## 2020-12-10 RX ADMIN — BUPIVACAINE 20 ML: 13.3 INJECTION, SUSPENSION, LIPOSOMAL INFILTRATION at 06:49

## 2020-12-10 RX ADMIN — ONDANSETRON 4 MG: 2 INJECTION INTRAMUSCULAR; INTRAVENOUS at 11:47

## 2020-12-10 RX ADMIN — PHENYLEPHRINE HYDROCHLORIDE 100 MCG: 10 INJECTION INTRAVENOUS at 08:00

## 2020-12-10 RX ADMIN — KETOROLAC TROMETHAMINE 15 MG: 15 INJECTION, SOLUTION INTRAMUSCULAR; INTRAVENOUS at 23:25

## 2020-12-10 RX ADMIN — SODIUM CHLORIDE, POTASSIUM CHLORIDE, SODIUM LACTATE AND CALCIUM CHLORIDE: 600; 310; 30; 20 INJECTION, SOLUTION INTRAVENOUS at 07:00

## 2020-12-10 RX ADMIN — ROCURONIUM BROMIDE 20 MG: 10 INJECTION INTRAVENOUS at 09:26

## 2020-12-10 RX ADMIN — FENTANYL CITRATE 50 MCG: 50 INJECTION, SOLUTION INTRAMUSCULAR; INTRAVENOUS at 06:39

## 2020-12-10 RX ADMIN — HEPARIN SODIUM 5000 UNITS: 1000 INJECTION INTRAVENOUS; SUBCUTANEOUS at 11:09

## 2020-12-10 RX ADMIN — CEFUROXIME 1.5 G: 1.5 INJECTION, POWDER, FOR SOLUTION INTRAVENOUS at 07:51

## 2020-12-10 RX ADMIN — HYDROMORPHONE HYDROCHLORIDE 0.5 MG: 1 INJECTION, SOLUTION INTRAMUSCULAR; INTRAVENOUS; SUBCUTANEOUS at 14:05

## 2020-12-10 RX ADMIN — OXYCODONE HYDROCHLORIDE 5 MG: 5 TABLET ORAL at 19:34

## 2020-12-10 RX ADMIN — SODIUM CHLORIDE, POTASSIUM CHLORIDE, SODIUM LACTATE AND CALCIUM CHLORIDE: 600; 310; 30; 20 INJECTION, SOLUTION INTRAVENOUS at 13:37

## 2020-12-10 RX ADMIN — PHENYLEPHRINE HYDROCHLORIDE 100 MCG: 10 INJECTION INTRAVENOUS at 08:11

## 2020-12-10 RX ADMIN — SODIUM CHLORIDE, POTASSIUM CHLORIDE, SODIUM LACTATE AND CALCIUM CHLORIDE: 600; 310; 30; 20 INJECTION, SOLUTION INTRAVENOUS at 22:46

## 2020-12-10 RX ADMIN — ROCURONIUM BROMIDE 20 MG: 10 INJECTION INTRAVENOUS at 08:45

## 2020-12-10 RX ADMIN — KETOROLAC TROMETHAMINE 15 MG: 15 INJECTION, SOLUTION INTRAMUSCULAR; INTRAVENOUS at 15:04

## 2020-12-10 RX ADMIN — OXYCODONE HYDROCHLORIDE 5 MG: 5 TABLET ORAL at 15:04

## 2020-12-10 RX ADMIN — ACETAMINOPHEN 650 MG: 325 TABLET, FILM COATED ORAL at 15:04

## 2020-12-10 RX ADMIN — ROCURONIUM BROMIDE 20 MG: 10 INJECTION INTRAVENOUS at 11:57

## 2020-12-10 RX ADMIN — DOCUSATE SODIUM AND SENNOSIDES 1 TABLET: 8.6; 5 TABLET ORAL at 21:02

## 2020-12-10 RX ADMIN — SUGAMMADEX 200 MG: 100 INJECTION, SOLUTION INTRAVENOUS at 12:58

## 2020-12-10 RX ADMIN — FUROSEMIDE 10 MG: 10 INJECTION, SOLUTION INTRAVENOUS at 10:30

## 2020-12-10 RX ADMIN — GABAPENTIN 300 MG: 300 CAPSULE ORAL at 06:14

## 2020-12-10 RX ADMIN — Medication 5 MG: at 08:11

## 2020-12-10 RX ADMIN — ACETAMINOPHEN 650 MG: 325 TABLET, FILM COATED ORAL at 23:25

## 2020-12-10 RX ADMIN — MIDAZOLAM 1 MG: 1 INJECTION INTRAMUSCULAR; INTRAVENOUS at 06:39

## 2020-12-10 RX ADMIN — ROCURONIUM BROMIDE 20 MG: 10 INJECTION INTRAVENOUS at 11:01

## 2020-12-10 RX ADMIN — ROCURONIUM BROMIDE 70 MG: 10 INJECTION INTRAVENOUS at 07:41

## 2020-12-10 RX ADMIN — SODIUM CHLORIDE, POTASSIUM CHLORIDE, SODIUM LACTATE AND CALCIUM CHLORIDE: 600; 310; 30; 20 INJECTION, SOLUTION INTRAVENOUS at 08:39

## 2020-12-10 RX ADMIN — ONDANSETRON 4 MG: 2 INJECTION INTRAMUSCULAR; INTRAVENOUS at 06:52

## 2020-12-10 RX ADMIN — PHENYLEPHRINE HYDROCHLORIDE 200 MCG: 10 INJECTION INTRAVENOUS at 07:56

## 2020-12-10 RX ADMIN — ONDANSETRON 4 MG: 2 INJECTION INTRAMUSCULAR; INTRAVENOUS at 13:27

## 2020-12-10 RX ADMIN — PROPOFOL 200 MG: 10 INJECTION, EMULSION INTRAVENOUS at 07:41

## 2020-12-10 ASSESSMENT — ACTIVITIES OF DAILY LIVING (ADL)
ADLS_ACUITY_SCORE: 14
ADLS_ACUITY_SCORE: 14

## 2020-12-10 ASSESSMENT — MIFFLIN-ST. JEOR: SCORE: 1869.75

## 2020-12-10 NOTE — OR NURSING
Pre-op BL TAP block performed without complications. VSS. Pt tolerated well. Will continue to monitor.  Pt received 1mg Versed and 50 mcg Fentanyl

## 2020-12-10 NOTE — PROGRESS NOTES
Transplant Surgery Post-Op Check    S: Pain controlled, no nausea, no complaints    O: Vital signs:  Temp: 98.2  F (36.8  C) Temp src: Skin BP: (P) 111/62 Pulse: (P) 80       Gen: NAD, resting in bed  Skin: Warm, pink  CV: Well perfused  Resp: Unlabored, 97% 2L  GI: Soft, flat, incisions approximated  : [place], clear yellow urine  Ext: No edema  Neuro: A&Ox4    A/P: POD #0 Right Kidney Laparoscopic Hand-Assisted living donor nephrectomy for donation. Doing well.    -Out of bed later today  -IS  -Comer until tomorrow morning  -Clear liquid diet  -IVF until adequate oral intake  -Pain control with oxycodone, Toradol, and APAP    Dayanna Bowman NP   Transplant Surgery

## 2020-12-10 NOTE — PHARMACY-TRANSPLANT NOTE
D/I: 24 year old male s/p kidney donation surgery on 12/10/2020.  Medications have been reviewed by the pharmacist for efficacy, appropriate dose, medication interactions and potential adverse effects.      A: Medications reviewed for this patient as above. No medication issues were noted.  P: Pharmacy will continue to monitor for any potential medication issues, and will make recommendations as appropriate. Medication therapy needs for discharge planning will continue to be addressed throughout the current admission via multidisciplinary rounds and order review.    Leona Huber, PharmD

## 2020-12-10 NOTE — ANESTHESIA CARE TRANSFER NOTE
Patient: Ari Delgadillo    Procedure(s):  Laparoscopic Hand Assisted Living Non-Directed Kidney Donor    Diagnosis: Encounter for donation of kidney [Z52.4]  Diagnosis Additional Information: No value filed.    Anesthesia Type:   General     Note:  Airway :Face Mask  Patient transferred to:PACU  Handoff Report: Identifed the Patient, Identified the Reponsible Provider, Reviewed the pertinent medical history, Discussed the surgical course, Reviewed Intra-OP anesthesia mangement and issues during anesthesia, Set expectations for post-procedure period and Allowed opportunity for questions and acknowledgement of understanding      Vitals: (Last set prior to Anesthesia Care Transfer)    CRNA VITALS  12/10/2020 1242 - 12/10/2020 1316      12/10/2020             Resp Rate (observed):  (!) 2                Electronically Signed By: Pee Morgan MD  December 10, 2020  1:16 PM

## 2020-12-10 NOTE — OP NOTE
Transplant Surgery  Operative Note    Procedure Date: 12/10/20   Preoperative Diagnosis: Healthy kidney donor   Postoperative Diagnosis: Healthy kidney donor   Procedure: 1. Right Kidney Laparoscopic Hand-Assisted living donor nephrectomy for donoation  2. Diagnostic laparoscopy    Surgeon:    Surgeon(s) and Role:     * Raysa Nguyen MD - Primary        Fellow/Assistant:   * Heber Strong MD - Fellow - Assisting  * Paty Bell MD PGY-1   Specimen: Right kidney and ureter   Anesthesia: General   Urine Output:   Estimated Blood Loss: 20 mls  Fluids Administered:      Intra Op Events: none     Complications: None.    Findings: the donor had 2 renal arteries that sandwiched the renal vein. Single renal vein and single ureter     Donor UNOS ID:  [id number] Flush Start time: 12/10/2020 11:21 AM   Arterial Clamp:  12/10/2020 11:19 AM Arterial anatomy: Double   Venous anatomy:  Single Ureteral anatomy:  Single     Indication: Ari Delgadillo presents for Right Kidney donation. The patient has undergone a thorough medical and psychosocial evaluation and was found suitable for voluntary kidney donation. Risks and benefits of donation were discussed. The patient expressed understanding, was willing to proceed, and provided informed consent.    Final ABO/Crossmatch verification: Prior to incision, I verified the donor ABO and recipient ABO.  I visually verified that the donor identification, blood type, and other vital data were compatible with the recipient.     Operative Procedure: Ari Delgadillo was transported to the operating room, placed on the operating table in the left lateral decubitus position and a universal timeout was performed. Sequential compression devices were placed on both lower extremities and general endotracheal anesthesia was induced.  The patient was given IV antibiotics and Comer catheter.  The abdomen was shaved, prepped, and draped in the usual sterile fashion.    We made a 6 cm  periumbilical midline incision and carried it down thru the linea alba.  The peritoneum was opened under direct vision.  The hand port was put into position and a right lower quadrant 10 mm port was placed over a trocar with hand assistance.  Pneumoperitoneum with CO2 was provided to 12 mmHg.  General survey with the laparoscope revealed no unusual findings.  An additional 10 mm port was placed just to the right of the midline in the upper abdomen under direct vision.    The right colon was released from its lateral attachments and rotated medially, revealing the kidney, ureter, duodenum and vena cava. The ureter was circumferentially dissected free distally then proximally taking care to preserve its vasculature.  We  identified the gonadal vein and dissected enbloc with the ureter. The proximal gonadal vein was not ligated and divided near the insertion into the cava.      We placed a 5 mm port in the right flank for a retractor, placed to lift the liver and gallbladder anteriorly. The duodenum was fully Kocherized and the anterior surface of the vena cava was cleared of investing tissue. Gerota's fascia was incised along the upper pole of the kidney and a plane was created between the kidney and the adrenal gland with the harmonic scalpel. The renal vein was then circumferentially cleared of extraneous tissue. The lower aspect of the renal artery was identified and cleared of investing lymphatics. We dissected  the kidney and ureter free posteriorly from the psoas up to the level of the renal artery.  The patient was given fluid, mannitol and Lasix, and the kidney was then dissected free from its lateral and superior attachments allowing full medial rotation. The renal artery was then circumferentially cleared of lymphatics and fat proximally toward its origin and behind the cava. The patient was heparinized and the distal ureter and gonadal vein were clipped and divided.  Good urine flow was seen. A laparoscopic  stapler was used fired across the renal artery and vein. The smaller inferior pole artery was doubly clipped prior to being transected    The kidney was delivered from the hand port, flushed, and placed in cold storage. Protamine was administered for full heparin reversal. Pneumoperitoneum was reestablished and hemostasis was obtained. Vascular transection sites were visualized and confirmed secure. The colon was placed back in its natural position. The 10 mm port sites were closed with 0-vicryl. The hand port was closed with 0-PDS. Skin incisions were irrigated and closed with 4-0 monocryl and Dermabond. Needle, sponge, and instrument counts were correct x2.  Faculty was present for key portions of the procedure. The patient tolerated the procedure well without apparent complications and was extubated and transferred to PACU in good condition.

## 2020-12-10 NOTE — LETTER
Transition Communication Hand-off for Care Transitions to Next Level of Care Provider    Name: Ari Delgadillo  : 1995  MRN #: [id number]  Primary Care Provider: [facility]     Primary Clinic: No address on file     Reason for Hospitalization:  Encounter for donation of kidney [Z52.4]  Admit Date/Time: 12/10/2020  5:04 AM  Discharge Date: 2020  Payor Source: No coverage found.               Reason for Communication Hand-off Referral: Other K donor    Discharge Plan:       Concern for non-adherence with plan of care:   Y/N N  Discharge Needs Assessment:  Needs      Most Recent Value   Equipment Currently Used at Home  none            Follow-up plan:    Future Appointments   Date Time Provider Department Center   2020 11:40 AM Raysa Nguyen MD [facility]       Any outstanding tests or procedures:              Key Recommendations:      Mai Gill RN    AVS/Discharge Summary is the source of truth; this is a helpful guide for improved communication of patient story

## 2020-12-10 NOTE — ANESTHESIA PROCEDURE NOTES
Peripheral Nerve Block Procedure Note      Staff -   Anesthesiologist:  Dolores Hernandez MD  Resident/Fellow: Rhianna Campoverde MD  Performed By: resident  Procedure performed by resident/CRNA in presence of a teaching physician.    Location: Pre-op  Procedure Start/Stop TImes:      12/10/2020 6:39 AM     12/10/2020 6:49 AM    patient identified, IV checked, site marked, risks and benefits discussed, informed consent, monitors and equipment checked, pre-op evaluation, at physician/surgeon's request and post-op pain management      Correct Patient: Yes      Correct Position: Yes      Correct Site: Yes      Correct Procedure: Yes      Correct Laterality:  Yes    Site Marked:  Yes  Procedure details:     Procedure:  TAP    Diagnosis:  Postoperative pain relief    Laterality:  Bilateral    Position:  Supine    Sterile Prep: chloraprep, mask and sterile gloves      Local skin infiltration:  None    Needle:  Insulated    Needle gauge:  21    Needle length (mm):  110    Ultrasound: Yes      Ultrasound used to identify targeted nerve, plexus, or vascular structure and placed a needle adjacent to it      Permanent Image entered into patiient's record      Abnormal pain on injection: No      Blood Aspirated: No      Paresthesias:  No    Bleeding at site: No      Bolus via:  Needle    Infusion Method:  Single Shot    Complications:  None  Assessment/Narrative:     Injection made incrementally with aspirations every (mL):  5     Discussed risks of nerve block, including nerve injury, bleeding, infection, incomplete analgesia.  Informed consent was obtained.   Patient tolerated well. Incremental aspiration every 5 mL. No paresthesia, no heme. Needle tip visualized throughout with appropriate spread of local anesthetic in fascial plane.   Block was placed at the surgeon's request for post operative pain control.

## 2020-12-10 NOTE — ANESTHESIA POSTPROCEDURE EVALUATION
Anesthesia POST Procedure Evaluation    Patient: Ari Delgadillo   MRN:     [id number] Gender:   male   Age:    24 year old :      1995        Preoperative Diagnosis: Encounter for donation of kidney [Z52.4]   Procedure(s):  Laparoscopic Hand Assisted Living Non-Directed Kidney Donor   Postop Comments: No value filed.     Anesthesia Type: General       Disposition: Admission   Postop Pain Control: Uneventful            Sign Out: Well controlled pain   PONV: No   Neuro/Psych: Uneventful            Sign Out: Acceptable/Baseline neuro status   Airway/Respiratory: Uneventful            Sign Out: Acceptable/Baseline resp. status   CV/Hemodynamics: Uneventful            Sign Out: Acceptable CV status   Other NRE: NONE   DID A NON-ROUTINE EVENT OCCUR? No         Last Anesthesia Record Vitals:  CRNA VITALS  12/10/2020 1242 - 12/10/2020 1342      12/10/2020             Resp Rate (observed):  (!) 2          Last PACU Vitals:  Vitals Value Taken Time   /56 12/10/20 1410   Temp 36.8  C (98.24  F) 12/10/20 1412   Pulse 74 12/10/20 1412   Resp 16 12/10/20 1345   SpO2 97 % 12/10/20 1412   Temp src     NIBP     Pulse     SpO2     Resp     Temp     Ht Rate     Temp 2     Vitals shown include unvalidated device data.      Electronically Signed By: Js Marks MD, December 10, 2020, 2:13 PM

## 2020-12-11 ENCOUNTER — TELEPHONE (OUTPATIENT)
Dept: TRANSPLANT | Facility: CLINIC | Age: 25
End: 2020-12-11

## 2020-12-11 VITALS
TEMPERATURE: 98.7 F | RESPIRATION RATE: 16 BRPM | BODY MASS INDEX: 22.78 KG/M2 | DIASTOLIC BLOOD PRESSURE: 69 MMHG | WEIGHT: 177.47 LBS | HEART RATE: 70 BPM | OXYGEN SATURATION: 97 % | HEIGHT: 74 IN | SYSTOLIC BLOOD PRESSURE: 122 MMHG

## 2020-12-11 PROBLEM — G89.18 ACUTE POST-OPERATIVE PAIN: Status: ACTIVE | Noted: 2020-12-11

## 2020-12-11 LAB
BUN SERPL-MCNC: 16 MG/DL (ref 7–30)
CK SERPL-CCNC: 395 U/L (ref 30–300)
CREAT SERPL-MCNC: 1.74 MG/DL (ref 0.66–1.25)
GFR SERPL CREATININE-BSD FRML MDRD: 54 ML/MIN/{1.73_M2}
HCT VFR BLD AUTO: 34.4 % (ref 40–53)
HGB BLD-MCNC: 11.4 G/DL (ref 13.3–17.7)

## 2020-12-11 PROCEDURE — 82550 ASSAY OF CK (CPK): CPT | Performed by: SURGERY

## 2020-12-11 PROCEDURE — 250N000013 HC RX MED GY IP 250 OP 250 PS 637: Performed by: SURGERY

## 2020-12-11 PROCEDURE — 99207 PR NO BILLABLE SERVICE THIS VISIT: CPT | Performed by: SURGERY

## 2020-12-11 PROCEDURE — 85018 HEMOGLOBIN: CPT | Performed by: SURGERY

## 2020-12-11 PROCEDURE — 82565 ASSAY OF CREATININE: CPT | Performed by: SURGERY

## 2020-12-11 PROCEDURE — 84520 ASSAY OF UREA NITROGEN: CPT | Performed by: SURGERY

## 2020-12-11 PROCEDURE — 85014 HEMATOCRIT: CPT | Performed by: SURGERY

## 2020-12-11 PROCEDURE — 250N000011 HC RX IP 250 OP 636: Performed by: SURGERY

## 2020-12-11 PROCEDURE — 36415 COLL VENOUS BLD VENIPUNCTURE: CPT | Performed by: SURGERY

## 2020-12-11 RX ORDER — ACETAMINOPHEN 325 MG/1
650 TABLET ORAL EVERY 4 HOURS PRN
Qty: 1 BOTTLE | Refills: 0 | Status: SHIPPED | OUTPATIENT
Start: 2020-12-11

## 2020-12-11 RX ORDER — OXYCODONE HYDROCHLORIDE 5 MG/1
5 TABLET ORAL EVERY 4 HOURS PRN
Qty: 18 TABLET | Refills: 0 | Status: SHIPPED | OUTPATIENT
Start: 2020-12-11 | End: 2021-01-07

## 2020-12-11 RX ORDER — OXYCODONE HYDROCHLORIDE 5 MG/1
5-10 TABLET ORAL EVERY 4 HOURS PRN
Status: DISCONTINUED | OUTPATIENT
Start: 2020-12-11 | End: 2020-12-11 | Stop reason: HOSPADM

## 2020-12-11 RX ORDER — AMOXICILLIN 250 MG
1-2 CAPSULE ORAL 2 TIMES DAILY
Qty: 60 TABLET | Refills: 0 | Status: SHIPPED | OUTPATIENT
Start: 2020-12-11 | End: 2021-01-07

## 2020-12-11 RX ADMIN — ACETAMINOPHEN 650 MG: 325 TABLET, FILM COATED ORAL at 15:18

## 2020-12-11 RX ADMIN — FAMOTIDINE 20 MG: 20 TABLET ORAL at 08:05

## 2020-12-11 RX ADMIN — KETOROLAC TROMETHAMINE 15 MG: 15 INJECTION, SOLUTION INTRAMUSCULAR; INTRAVENOUS at 15:18

## 2020-12-11 RX ADMIN — ACETAMINOPHEN 650 MG: 325 TABLET, FILM COATED ORAL at 08:05

## 2020-12-11 RX ADMIN — DOCUSATE SODIUM AND SENNOSIDES 2 TABLET: 8.6; 5 TABLET ORAL at 08:29

## 2020-12-11 RX ADMIN — ACETAMINOPHEN 650 MG: 325 TABLET, FILM COATED ORAL at 02:54

## 2020-12-11 RX ADMIN — OXYCODONE HYDROCHLORIDE 5 MG: 5 TABLET ORAL at 03:50

## 2020-12-11 RX ADMIN — KETOROLAC TROMETHAMINE 15 MG: 15 INJECTION, SOLUTION INTRAMUSCULAR; INTRAVENOUS at 08:05

## 2020-12-11 RX ADMIN — ACETAMINOPHEN 650 MG: 325 TABLET, FILM COATED ORAL at 10:56

## 2020-12-11 ASSESSMENT — ACTIVITIES OF DAILY LIVING (ADL)
ADLS_ACUITY_SCORE: 14

## 2020-12-11 NOTE — DISCHARGE SUMMARY
[facility]     Discharge Summary  Solid Organ Transplant Surgery    Date of Admission:  12/10/2020  Date of Discharge:  12/11/2020  Date of Service (when I saw the patient): 12/11/20    Discharge Diagnoses   Principal Problem:    Encounter for donation of kidney  Active Problems:    Acute post-operative pain      Procedure/Surgery Information   Procedure: Procedure(s):  Laparoscopic Hand Assisted Living Non-Directed Kidney Donor   Surgeon(s): Surgeon(s) and Role:     * Raysa Nguyen MD - Primary     * Heber Strong MD - Fellow - Assisting             History of Present Illness   Ari Delgadillo is a healthy 24 year old male who presented for nephrectomy for kidney donation.    Hospital Course   Right nephrectomy for donation: Patient tolerated the procedure well. Ambulating, passing flatus, and tolerating oral intake prior to discharge.     Post-operative pain control: included Exparel block, Toradol, acetaminophen, and oxycodone and will be acetaminophen and oxycodone (5mg, #18) on discharge. The patient was instructed to avoid NSAID medications.    Raysa Nguyen MD / Dayanna Bowman NP     Discharge Disposition   Discharged to home   Condition at discharge: Stable    Primary Care Physician   [facility]     Consultations This Hospital Stay   PHARMACY IP CONSULT  SOT MEDICATION HISTORY IP PHARMACY CONSULT    Time Spent on this Encounter   I have spent less than 30 minutes on this discharge.    Discharge Orders      Reason for your hospital stay    Right nephrectomy for donation     Adult [facility] Follow-up and recommended labs and tests    Follow up with Dr. Nguyen on 12/22/2020.  If you need to change your appointment please contact your coordinator at [phone].     Activity    Your activity upon discharge: Don't lift anything heavier than 10 pounds for 6 weeks (or longer if your surgeon has discussed this with you).   Walk  regularly.    OK to drive only after no longer taking narcotics AND you feel comfortable working the breaks/clutch suddenly if needed.  Limit sports and strenuous activities/'core' exercises for 6 weeks.  If you experience pain after exertion, try an ice pack or warm pack to the area.   Wear abdominal binder for comfort if you desire.    You have been instructed to avoid NSAIDs (ibuprofen/aspirin like medications) as these medications may affect the remaining kidney.     Keep narcotics out of reach of children. When finished with them, please destroy/discard any remaining pills responsibly. Often, your [facility] government office, local police station or pharmacy will have a drug deposit box.     When to contact your care team    Your transplant coordinator if you have any of the following:   Swelling, oozing, worsening pain, unusual redness around the incision, or if:  Fever of 100.5 F or higher   Increasing abdominal pain   Nausea or vomiting   Severe diarrhea, bloating, or constipation     Any concerns or questions, please call your Transplant coordinator:    Phone: [phone].  If they are not available, the on call coordinator/MD will help you with your concern.     Wound care and dressings    Instructions to care for your wound at home: OK to shower. Gently wash around your incisions with soap and water.   Don't bathe/submerge incisions until glue is off and any openings are closed.  Wear loose-fitting clothes. This will help you be more comfortable and cause less irritation around your incisions.     Discharge Instructions    Please be very careful to avoid COVID-19 infection. Everyone in your household should:  -Use a mask around any people that do not live with you.  -Practice social distancing of at least 6 feet around any people that do not live with you.  -Avoid any unnecessary gatherings with people that do not live with you.  -Wash your hands frequently when in public.     If you or people that you live  with develop any of the following COVID symptoms, notify your coordinator:  -Fever  -Cough  -Shortness of breath  -New headache  -Body aches  -Chills  -Loss of taste or smell  -Sore throat  -New cold symptoms  -New diarrhea     Diet    Follow this diet upon discharge: Keep yourself well hydrated (goal 1.5 liters per day).   Eat lightly the first week as tolerated and avoid constipating foods.  If you are constipated, take a stool softener like Milk of Magnesia.     Discharge Medications   Current Discharge Medication List      START taking these medications    Details   acetaminophen (TYLENOL) 325 MG tablet Take 2 tablets (650 mg) by mouth every 4 hours as needed for pain Don't take other medications containing acetaminophen  Qty: 1 Bottle, Refills: 0    Associated Diagnoses: Encounter for donation of kidney      oxyCODONE (ROXICODONE) 5 MG tablet Take 1 tablet (5 mg) by mouth every 4 hours as needed for moderate to severe pain  Qty: 18 tablet, Refills: 0    Associated Diagnoses: Encounter for donation of kidney      senna-docusate (SENOKOT-S/PERICOLACE) 8.6-50 MG tablet Take 1-2 tablets by mouth 2 times daily  Qty: 60 tablet, Refills: 0    Associated Diagnoses: Encounter for donation of kidney         CONTINUE these medications which have NOT CHANGED    Details   loratadine (CLARITIN) 10 MG tablet daily as needed for allergies            Allergies   No Known Allergies  Data   Most Recent 3 Creatinines:  Recent Labs   Lab Test 12/11/20  0651 12/01/20  0855 10/02/20  0927   CR 1.74* 1.01 1.09     Most Recent 3 Hemoglobins:  Recent Labs   Lab Test 12/11/20  0651 12/10/20  1351 12/01/20  0855   HGB 11.4* 12.8* 14.6

## 2020-12-11 NOTE — PROGRESS NOTES
LIVING DONOR SOCIAL WORK AND INDEPENDENT LIVING DONOR ADVOCATE NOTE:  D:  Mr. Ari Delgadillo is a living kidney donor, POD #1.  I:  I met with him at bedside to thank him for his donation and to assess for any psychosocial needs and to assist with discharge planning.  I assessed the patient's mood/affect, plans for recovery, and any feelings of regret/remorse regarding donation.   A:  Ari is resting comfortably.  He appears to be in a positive mood and affect is congruent.  He states that pain is well controlled.  Patient describes donation recovery as well so far.  He and I discussed how to reach me should he have any post operative psychosocial needs.  He reports no feelings of regret or remorse about donation.  He denies any discharge planning needs at this time.  Patient plans to discharge to home with the care and support of his family.   P: Donor /JANETH will remain available to assist with any psychosocial and advocacy needs, both inpatient and outpatient, as needed.  Patient is aware of follow-up recommendations and has my contact information.    MARIA ELENA Ramsey, [facility]  Clinical  and Independent Donor Advocate  [facility] Transplant [facility]  Pager:  [phone]  Direct:  [phone]

## 2020-12-11 NOTE — PLAN OF CARE
"/55 (BP Location: Right arm)   Pulse 76   Temp 98.3  F (36.8  C) (Oral)   Resp 16   Ht 1.88 m (6' 2\")   Wt 80.5 kg (177 lb 7.5 oz)   SpO2 97%   BMI 22.79 kg/m      Patient stable on RA, afebrile. Pain managed w/ PRN oxy x1 and  scheduled tylenol and toradol. No complaints of nausea. Tolerating clear liquid diet w/ advancements as tolerate diet with good appetite. Patient had saltine crackers w/ no nausea. PIV infusing LR at 100 ml/hr. No BM overnight; passing minimal flatus.  Good UOP via gustafson catheter.  Patient is up independently; ambulated in halls x2.  Educated on body mechanics and pain management. Plan for removal of gustafson today (POD 1) once patient awakes. Will continue with POC and notify MD with changes or concerns.    "

## 2020-12-11 NOTE — PLAN OF CARE
DISCHARGE:  D: Patient with orders to discharge to home.  I: Discharge instructions, medications & follow ups reviewed with patient. Copy of discharge summary given to patient. PIV removed. All belongings packed & sent with patient. Medications filled & picked up at discharge pharmacy.  A: Patient in stable condition. SHRAVAN Comer removed this AM, good UOP. Education given about post-op precautions going home. Patient had no further questions regarding discharge instructions and medications. Patient left with brother in law.  P: Plan for post-op follow up appointment.

## 2020-12-11 NOTE — PHARMACY-TRANSPLANT NOTE
[facility] Transplant Donor Prior to Discharge Note  24 year old male s/p kidney donation surgery on 12/10/2020.     Pharmacy has monitored for any potential medication issues.  In anticipation for discharge, medication therapy needs have been addressed daily throughout the current admission via multidisciplinary rounds and/or discussions, order verification, daily clinical pharmacy review, and communication with prescribers.      Tee JohnsonD, [facility]  Inpatient clinical pharmacist

## 2020-12-11 NOTE — PLAN OF CARE
Arrived to unit @ 1445 from PACU  VS: stable  Neuro: A&O  Mobility: up SBA; ambulated in halls   Discomfort: endorses pain; receiving scheduled tylenol and toradol and PRN oxy    Declined antiemetic  LDAs: PIV intact with LR @ 100mL/hr  Labs: hgb 12.8  : gustafson intact with clear yellow urine   GI: no flatus since surgery  Plan:   will continue to monitor

## 2020-12-11 NOTE — PHARMACY-ADMISSION MEDICATION HISTORY
Admission medication history interview status for the 12/10/2020 admission is complete. See Epic admission navigator for allergy information, pharmacy, prior to admission medications and immunization status.     Medication history interview sources:  patient    Changes made to PTA medication list (reason)  Added: none  Deleted: none  Changed: none    Additional medication history information (including reliability of information, actions taken by pharmacist): patient reports no recent prescription or OTC medication use.        Prior to Admission medications    Medication Sig Last Dose Taking? Auth Provider   loratadine (CLARITIN) 10 MG tablet daily as needed for allergies  More than a month at Unknown time  Reported, Patient         Medication history completed by: Leona Huber, PharmD, BCPS

## 2020-12-11 NOTE — PROGRESS NOTES
Transplant Surgery  Inpatient Daily Progress Note  12/11/2020    Mr. Delgadillo is Post-operative day #1 s/p laparoscopic right donor nephrectomy. Issues Overnight: None     Patient Vitals for the past 24 hrs:   BP Temp Temp src Pulse Resp SpO2   12/10/20 2332 116/55 -- -- 76 16 97 %   12/10/20 1907 114/66 98.3  F (36.8  C) Oral 77 18 95 %   12/10/20 1545 109/57 -- -- 75 20 94 %   12/10/20 1516 114/61 97.9  F (36.6  C) Oral 74 20 97 %   12/10/20 1500 111/62 -- -- 80 16 97 %   12/10/20 1415 117/56 98.2  F (36.8  C) -- 70 16 98 %   12/10/20 1410 117/56 98.2  F (36.8  C) -- 75 14 98 %   12/10/20 1400 114/56 97.9  F (36.6  C) -- 71 16 98 %   12/10/20 1345 113/58 97.9  F (36.6  C) Skin 72 16 98 %   12/10/20 1330 99/53 -- -- 73 16 99 %   12/10/20 1315 99/46 97.6  F (36.4  C) Oral 81 16 99 %       Pain: Visual Analog Score: 2  Nausea:    [x]    None      []    Some, but not needing medication    []    Yes, needing medication      []    Dry Retching    []    Vomited    DREAMS Performance:    DRinking: yes   Eating: yes   Analgesia: Good   Mobilizing:  yes   Slept: yes    Passed flatus? No  Incision(s): C/D/I with no tiffany-incisional ecchymoses  Abdomen: mild distention, appropriately tender, soft  Extremities: no cyanosis or edema   Neuro: A&Ox4  Pulm: IS 1000, room air    Allergies:   No Known Allergies    Medications:  Prescription Medications as of 12/11/2020       Rx Number Disp Refills Start End Last Dispensed Date Next Fill Date Owning Pharmacy    loratadine (CLARITIN) 10 MG tablet            Sig: daily as needed for allergies     Class: Historical      Hospital Medications as of 12/11/2020       Dose Frequency Start End    acetaminophen (TYLENOL) tablet 650 mg 650 mg EVERY 4 HOURS 12/10/2020 12/13/2020    Admin Instructions: Administer for multimodal surgical pain management.<BR>Pharmacy to time the next dose 8 hours from PRE OP dose.<BR>Maximum acetaminophen dose from all sources = 75 mg/kg/day not to exceed 4 grams/day.  "   Class: E-Prescribe    Route: Oral    bisacodyl (DULCOLAX) Suppository 10 mg 10 mg DAILY PRN 12/10/2020     Admin Instructions: Hold for loose stools.    Class: E-Prescribe    Route: Rectal    bupivacaine liposome (EXPAREL) LONG ACTING injection was administered into the infiltration site to produce postsurgical analgesia. Duration of action is up to 72 hours, and other \"radha\" medications should not be given for 96 hours with the exception of the lidocaine 5% patch (LIDODERM) and the lidocaine 10mg in potassium infusions. This entry is for INFORMATION ONLY.  CONTINUOUS PRN 12/10/2020 12/14/2020    Admin Instructions: NURSE to notify physician if patient has ringing in the ears, metallic taste in the mouth, or circumoral numbness    Class: E-Prescribe    Route: Does not apply    cefuroxime (ZINACEF) 1.5 g vial to attach to  ml bag for ADULTS or NS 50 ml bag for PEDS (Completed) 1.5 g PRE-OP/PRE-PROCEDURE 12/10/2020 12/10/2020    Admin Instructions: IV PUSH One Time Only. <BR>Give over 3-5 minutes upon induction of anesthesia.    Class: E-Prescribe    Route: Intravenous    famotidine (PEPCID) tablet 20 mg 20 mg DAILY 12/10/2020     Class: E-Prescribe    Route: Oral    heparin (porcine) injection 5,000 Units (Completed) 5,000 Units ONCE 12/10/2020 12/10/2020    Admin Instructions: At direction of Surgeon, administer heparin intra-operatively 3 minutes before renal artery is clamped.    Class: E-Prescribe    Route: Intravenous    ketorolac (TORADOL) injection 15 mg 15 mg EVERY 8 HOURS 12/10/2020 12/12/2020    Admin Instructions: Pharmacy to time the first dose based on the timing of the INTRA-OP dose.<BR>Can cause pain on injection.  If ordered intravenously (IV) : administer through a running maintenance fluid over 1 minute followed by a flush.  If patient complains of pain on injection, may dilute 15-30 mg in 5 mL and push over 1 to 2 minutes.      Class: E-Prescribe    Route: Intravenous    naloxone (NARCAN) " injection 0.2 mg 0.2 mg EVERY 2 MIN PRN 12/10/2020 12/11/2020    Admin Instructions: Administer intravenous route when available and notify provider when administered.<BR>For unintended sedation or respiratory depression if all of the below criteria are met:<BR>~ respiratory rate LESS than or EQUAL to 8. <BR>~SaO2 less than 92% and or/end-tidal CO2 is greater than 50.<BR>~ the patient is receiving an opioid, has unintended sedations assessed as RASS (-3), and is currently not on mechanical ventilation.  RASS scale moderate (-3) is movement or eye opening to voice but no eye contact.<BR><BR>Patient Monitoring<BR>Once the patient has demonstrated a response to the naloxone, continue to monitor respiratory rate, depth, oxygen saturation and end-tidal CO2 (if available) every 15 minutes x 2, then every 30 minutes x 2, then every 1 hour x 1 after each naloxone dose.  Consider transfer to ICU if patient respiratory parameters have not improved after 4 naloxone doses.<BR>For ordered IV doses 0.1-2mg give IVP. Give each 0.4mg over 15 seconds in emergency situations. For non-emergent situations further dilute in 9mL of NS to facilitate titration of response.    Class: E-Prescribe    Route: Intravenous    naloxone (NARCAN) injection 0.2 mg 0.2 mg EVERY 2 MIN PRN 12/10/2020 12/11/2020    Admin Instructions: Administer intramuscular if an intravenous route is not available and notify provider when administered.<BR>For unintended sedation or respiratory depression if all of the below criteria are met:<BR>~ respiratory rate LESS than or EQUAL to 8. <BR>~SaO2 less than 92% and or/end-tidal CO2 is greater than 50.<BR>~ the patient is receiving an opioid, has unintended sedations assessed as RASS (-3), and is currently not on mechanical ventilation.  RASS scale moderate (-3) is movement or eye opening to voice but no eye contact.<BR><BR>Patient Monitoring<BR>Once the patient has demonstrated a response to the naloxone, continue to  monitor respiratory rate, depth, oxygen saturation and end-tidal CO2 (if available) every 15 minutes x 2, then every 30 minutes x 2, then every 1 hour x 1 after each naloxone dose.  Consider transfer to ICU if patient respiratory parameters have not improved after 4 naloxone doses.<BR>For ordered IV doses 0.1-2mg give IVP. Give each 0.4mg over 15 seconds in emergency situations. For non-emergent situations further dilute in 9mL of NS to facilitate titration of response.    Class: E-Prescribe    Route: Intramuscular    naloxone (NARCAN) injection 0.4 mg 0.4 mg EVERY 2 MIN PRN 12/10/2020 12/11/2020    Admin Instructions: Administer intravenous route when available and notify provider when administered.<BR>For unintended sedation or respiratory depression if all of the below criteria are met:<BR>~ respiratory rate LESS than or EQUAL to 8.<BR>~ SaO2 less than 92% and or/end-tidal CO2 is greater than 50.<BR>~ the patient is receiving an opioid, has unintended sedation assessed as RASS (-4) or (-5) and patient is currently not on mechanical ventilation.  RASS scale (-4) is deep sedation with no response to voice but movement or eye opening to physical stimulation.  RASS scale (-5) is unarousable.  <BR><BR>Patient Monitoring<BR>Once the patient has demonstrated a response to the naloxone, continue to monitor respiratory rate, depth, oxygen saturation and end-tidal CO2 (if available) every 15 minutes x 2, then every 30 minutes x 2, then every 1 hour x 1 after each naloxone dose.  Consider transfer to ICU if patient respiratory parameters have not improved after 4 naloxone doses.<BR>For ordered IV doses 0.1-2mg give IVP. Give each 0.4mg over 15 seconds in emergency situations. For non-emergent situations further dilute in 9mL of NS to facilitate titration of response.    Class: E-Prescribe    Route: Intravenous    naloxone (NARCAN) injection 0.4 mg 0.4 mg EVERY 2 MIN PRN 12/10/2020 12/11/2020    Admin Instructions:  "Administer intramuscular if an intravenous route is not available and notify provider when administered.<BR>For unintended sedation or respiratory depression if all of the below criteria are met:<BR>~ respiratory rate LESS than or EQUAL to 8.<BR>~ SaO2 less than 92% and or/end-tidal CO2 is greater than 50.<BR>~ the patient is receiving an opioid, has unintended sedation assessed as RASS (-4) or (-5) and patient is currently not on mechanical ventilation.  RASS scale (-4) is deep sedation with no response to voice but movement or eye opening to physical stimulation.  RASS scale (-5) is unarousable.  <BR><BR>Patient Monitoring<BR>Once the patient has demonstrated a response to the naloxone, continue to monitor respiratory rate, depth, oxygen saturation and end-tidal CO2 (if available) every 15 minutes x 2, then every 30 minutes x 2, then every 1 hour x 1 after each naloxone dose.  Consider transfer to ICU if patient respiratory parameters have not improved after 4 naloxone doses.<BR>For ordered IV doses 0.1-2mg give IVP. Give each 0.4mg over 15 seconds in emergency situations. For non-emergent situations further dilute in 9mL of NS to facilitate titration of response.    Class: E-Prescribe    Route: Intramuscular    ondansetron (ZOFRAN) injection 4 mg 4 mg EVERY 6 HOURS PRN 12/10/2020     Admin Instructions: This is Step 1 of nausea and vomiting management.  If nausea not resolved in 15 minutes, go to Step 2 prochlorperazine (COMPAZINE).<BR>Irritant. For ordered IV doses 0.1-4 mg, give IV Push undiluted over 2-5 minutes.    Class: E-Prescribe    Route: Intravenous    Linked Group 1: \"Or\" Linked Group Details        ondansetron (ZOFRAN-ODT) ODT tab 4 mg 4 mg EVERY 6 HOURS PRN 12/10/2020     Admin Instructions: This is Step 1 of nausea and vomiting management.  If nausea not resolved in 15 minutes, go to Step 2 prochlorperazine (COMPAZINE). Do not push through foil backing. Peel back foil and gently remove. Place on " "tongue immediately. Administration with liquid unnecessary<BR>With dry hands, peel back foil backing and gently remove tablet. Do not push oral disintegrating tablet through foil backing. Administer immediately on tongue and oral disintegrating tablet dissolves in seconds, then swallow with saliva. Liquid not required.    Class: E-Prescribe    Route: Oral    Linked Group 1: \"Or\" Linked Group Details        oxyCODONE (ROXICODONE) tablet 5-10 mg 5-10 mg EVERY 4 HOURS PRN 12/11/2020     Admin Instructions: ~ Start at the lowest dose.<BR>~ May adjust dose by 5 mg every 3 hours to optimize patient mobilization and comfort as needed.  Maximum individual dose is 10 mg.<BR>~ Hold dose for analgesic side effects.  <BR>~ Notify provider to assess patient for uncontrolled pain or analgesic side effects. <BR>~ Maximum total is 80 mg in 24 hours.    Route: Oral    prochlorperazine (COMPAZINE) injection 10 mg 10 mg EVERY 6 HOURS PRN 12/10/2020     Admin Instructions: This is Step 2 of nausea and vomiting management. <BR>If nausea not resolved in 15-30 minutes, Notify provider.<BR>For ordered IV doses 0.1-10 mg, give IV push undiluted, each 5 mg over 1 minute.    Class: E-Prescribe    Route: Intravenous    Linked Group 2: \"Or\" Linked Group Details        prochlorperazine (COMPAZINE) tablet 10 mg 10 mg EVERY 6 HOURS PRN 12/10/2020     Admin Instructions: This is Step 2 of nausea and vomiting management. <BR>If nausea not resolved in 15-30 minutes, Notify provider.    Class: E-Prescribe    Route: Oral    Linked Group 2: \"Or\" Linked Group Details        protamine injection 50 mg (Completed) 50 mg ONCE 12/10/2020 12/10/2020    Admin Instructions: At direction of Surgeon, administer protamine intra-operatively.  Give slowly once renal artery is divided. <BR>1 ml protamine (10 mg/mL) for every 1 ml heparin (1000 units/mL)    Class: E-Prescribe    Route: Intravenous    senna-docusate (SENOKOT-S/PERICOLACE) 8.6-50 MG per tablet 1 tablet 1 " "tablet 2 TIMES DAILY 12/10/2020     Admin Instructions: If no bowel movement in 24 hours, increase to 2 tablets PO.  Hold for loose stools.<BR>Hold for loose stools.    Class: E-Prescribe    Route: Oral    Linked Group 3: \"Or\" Linked Group Details        senna-docusate (SENOKOT-S/PERICOLACE) 8.6-50 MG per tablet 2 tablet 2 tablet 2 TIMES DAILY 12/10/2020     Admin Instructions: Hold for loose stools.<BR>Hold for loose stools.    Class: E-Prescribe    Route: Oral    Linked Group 3: \"Or\" Linked Group Details              Labs:  Lab Results   Component Value Date    CR 1.74 (H) 12/11/2020    CR 1.01 12/01/2020    CR 1.09 10/02/2020     Lab Results   Component Value Date    HGB 11.4 (L) 12/11/2020    HGB 12.8 (L) 12/10/2020    HGB 14.6 12/01/2020       Assessment: Post-operative day #1, doing well.    Plan:   -Encouraged ambulation and ISB   -Continue GI and DVT prophylaxis  -Pain control: Scheduled acetaminophen, Exparel, PRN oxycodone, Toradol.  -Remove gustafson  -Discontinue IV fluid    Discharge planning: Today vs tomorrow    Dayanna Bowman NP [id number]  Division of Transplantation      "

## 2020-12-12 ENCOUNTER — TELEPHONE (OUTPATIENT)
Dept: TRANSPLANT | Facility: CLINIC | Age: 25
End: 2020-12-12

## 2020-12-12 ENCOUNTER — PATIENT OUTREACH (OUTPATIENT)
Dept: CARE COORDINATION | Facility: CLINIC | Age: 25
End: 2020-12-12

## 2020-12-12 DIAGNOSIS — Z52.4 ENCOUNTER FOR DONATION OF KIDNEY: ICD-10-CM

## 2020-12-12 DIAGNOSIS — Z00.5 TRANSPLANT DONOR EVALUATION: Primary | ICD-10-CM

## 2020-12-12 RX ORDER — METHOCARBAMOL 500 MG/1
500 TABLET, FILM COATED ORAL 4 TIMES DAILY PRN
Qty: 28 TABLET | Refills: 0 | Status: SHIPPED | OUTPATIENT
Start: 2020-12-12 | End: 2020-12-19

## 2020-12-12 NOTE — TELEPHONE ENCOUNTER
Ari paged that he was having muscle spasms, but attempted to reach and went to voicemail. Left message. Attempted 5 times and received voicemail each time.   Was able to reach Ari. He said he could see calls coming, but he wasn't able to answer.   Patient reports that last night his back became tight and this morning he is having horrible muscle spasms from his lower back to his abdomen. He is drinking 10-12 glasses of water. He thinks they are about 10 ounce glasses. No issues with urinating, no burning or pain with urination. He is passing gas, but no bowel movement. He does not feel he in constipated.   Discussed with dr Strong patient to have robaxin 500 mg QID PRN for muscle spasms.  Patient will  at [facility]. Patient encouraged to call back if any new concerns arise or if he robaxin does not help with the spasms.

## 2020-12-14 ENCOUNTER — TELEPHONE (OUTPATIENT)
Dept: TRANSPLANT | Facility: CLINIC | Age: 25
End: 2020-12-14

## 2020-12-15 NOTE — PROGRESS NOTES
The patient was contacted by another RN for post-hospital follow up. Will close encounter at this time.    Tatyana Atkinson CMA, [facility]  Post Hospital Discharge Team

## 2020-12-17 NOTE — TELEPHONE ENCOUNTER
From: Ari Delgadillo [email or web address]   Sent: Monday, December 14, 2020 5:42 PM  To: Rochelle Long <[email or web address]  Subject: Re: Rochelle checking in    Emily Byrd!    I'm feeling pretty good. I am a bit slower than normal, and I keep falling asleep by accident, but otherwise pretty good. Woke up Saturday with some pretty gnarly muscle cramps. That was not the best, but the medication helped. Off both the muscle relaxants and opioids now!    Feel free to call again, hopefully I'll have my phone on me this time and it'll stop sending you straight to [employer]. Also, thank you very much for care package. The blanket is fantastic!    On Mon, Dec 14, 2020 at 3:13 PM Rochelle Long [email or web address]> wrote:  Olegario Chapman, I tried reaching you by phone, How are you feeling?  Let me know, I would like to find out if the mediation prescribed over the weekend has helped you?    Please respond to email if you can.  I will trying calling you tomorrow.  Take Care!       Rochelle Long (Mandy), RN, BA   Transplant Coordinator

## 2020-12-22 ENCOUNTER — OFFICE VISIT (OUTPATIENT)
Dept: TRANSPLANT | Facility: CLINIC | Age: 25
End: 2020-12-22
Attending: SURGERY

## 2020-12-22 VITALS
HEART RATE: 60 BPM | DIASTOLIC BLOOD PRESSURE: 55 MMHG | WEIGHT: 175.8 LBS | SYSTOLIC BLOOD PRESSURE: 111 MMHG | BODY MASS INDEX: 22.57 KG/M2

## 2020-12-22 DIAGNOSIS — Z09 FOLLOW-UP SURGERY CARE: Primary | ICD-10-CM

## 2020-12-22 PROCEDURE — 99024 POSTOP FOLLOW-UP VISIT: CPT | Performed by: SURGERY

## 2020-12-22 NOTE — LETTER
12/22/2020         RE: Ari Delgadillo  [address] [place] [address]        Dear Colleague,    Thank you for referring your patient, Ari Delgadillo, to the [facility] TRANSPLANT [facility]. Please see a copy of my visit note below.    Transplant Surgery Kidney Donor Progress Note     Medical record number: [id number]  YOB: 1995,   Date of Visit:  12/22/2020  For followup after kidney donation.    Assessment and Recommendations: Mr. Delgadillo is doing well after kidney donation.     1. Lifting restrictions: 10 lbs until 6 weeks post donation.  2. Followup: 4 weeks  3. Return to work to be determined per patient's progress, expected between 6-8 weeks after surgery.    Total time: 15 minutes  Counselling time: 10 minutes      Raysa Nguyen MD FACS  Assistant Professor of Surgery  Director, Living Kidney Donor Program.    ------------------------------------------------------------------------------------------    S: Mr. Delgadillo donate a kidney 2 weeks ago and is doing well, reporting no fevers, chills, dysuria.  He is not taking narcotic analgesia.  He is not constipated.  He is mostly back to routine activities of daily living and is feeling ready to return to work at this time.    Medications:  Prescription Medications as of 1/7/2021       Rx Number Disp Refills Start End Last Dispensed Date Next Fill Date Owning Pharmacy    acetaminophen (TYLENOL) 325 MG tablet  1 Bottle 0 12/11/2020    [facility] Discharge - [address]    Sig: Take 2 tablets (650 mg) by mouth every 4 hours as needed for pain Don't take other medications containing acetaminophen    Class: E-Prescribe    Route: Oral    loratadine (CLARITIN) 10 MG tablet            Sig: daily as needed for allergies     Class: Historical    oxyCODONE (ROXICODONE) 5 MG tablet  18 tablet 0 12/11/2020    [address]    Sig: Take 1 tablet (5  mg) by mouth every 4 hours as needed for moderate to severe pain    Class: E-Prescribe    Earliest Fill Date: 12/11/2020    Route: Oral    senna-docusate (SENOKOT-S/PERICOLACE) 8.6-50 MG tablet  60 tablet 0 12/11/2020    [facility] Discharge - [place] - [address] [facility]    Sig: Take 1-2 tablets by mouth 2 times daily    Class: E-Prescribe    Route: Oral          Exam:      Appearance: in no apparent distress.   Skin: normal  Head and Neck: Normal, no rashes or jaundice  Respiratory: normal respiratory excursions, no audible wheeze  Cardiovascular: RRR  Abdomen: flat, No distention and Surgical scars consistent with history.  Incisions C/D/I   Extremeties: Edema, none  Neuro: without deficit       Labs:   Admission on 12/10/2020, Discharged on 12/11/2020   Component Date Value Ref Range Status     Glucose 12/10/2020 120* 70 - 99 mg/dL Final    Dr/RN Notified     Miscellaneous Test 12/10/2020      Final                    Value:Specimen Received, Reordered and sent to Performing laboratory - Report to follow upon   completion.       Hemoglobin 12/10/2020 12.8* 13.3 - 17.7 g/dL Final     Hematocrit 12/10/2020 37.0* 40.0 - 53.0 % Final     CK Total 12/10/2020 191  30 - 300 U/L Final     Hemoglobin 12/11/2020 11.4* 13.3 - 17.7 g/dL Final     Hematocrit 12/11/2020 34.4* 40.0 - 53.0 % Final     Urea Nitrogen 12/11/2020 16  7 - 30 mg/dL Final     Creatinine 12/11/2020 1.74* 0.66 - 1.25 mg/dL Final     GFR Estimate 12/11/2020 54* >60 mL/min/[1.73_m2] Final    Comment: Non  GFR Calc  Starting 12/18/2018, serum creatinine based estimated GFR (eGFR) will be   calculated using the Chronic Kidney Disease Epidemiology Collaboration   (CKD-EPI) equation.       GFR Estimate If Black 12/11/2020 62  >60 mL/min/[1.73_m2] Final    Comment:  GFR Calc  Starting 12/18/2018, serum creatinine based estimated GFR (eGFR) will be   calculated using the Chronic Kidney Disease Epidemiology  Collaboration   (CKD-EPI) equation.       CK Total 12/11/2020 395* 30 - 300 U/L Final   Again, thank you for allowing me to participate in the care of your patient.      Sincerely,      Raysa Nguyen MD

## 2021-01-04 ENCOUNTER — HEALTH MAINTENANCE LETTER (OUTPATIENT)
Age: 26
End: 2021-01-04

## 2021-01-07 ENCOUNTER — TELEPHONE (OUTPATIENT)
Dept: TRANSPLANT | Facility: CLINIC | Age: 26
End: 2021-01-07

## 2021-01-07 DIAGNOSIS — Z52.4 KIDNEY DONOR: Primary | ICD-10-CM

## 2021-01-07 NOTE — PROGRESS NOTES
Transplant Surgery Kidney Donor Progress Note     Medical record number: [id number]  YOB: 1995,   Date of Visit:  12/22/2020  For followup after kidney donation.    Assessment and Recommendations: Mr. Delgadillo is doing well after kidney donation.     1. Lifting restrictions: 10 lbs until 6 weeks post donation.  2. Followup: 4 weeks  3. Return to work to be determined per patient's progress, expected between 6-8 weeks after surgery.    Total time: 15 minutes  Counselling time: 10 minutes      Raysa Nguyen MD FACS  Assistant Professor of Surgery  Director, Living Kidney Donor Program.    ------------------------------------------------------------------------------------------    S: Mr. Delgadillo donate a kidney 2 weeks ago and is doing well, reporting no fevers, chills, dysuria.  He is not taking narcotic analgesia.  He is not constipated.  He is mostly back to routine activities of daily living and is feeling ready to return to work at this time.    Medications:  Prescription Medications as of 1/7/2021       Rx Number Disp Refills Start End Last Dispensed Date Next Fill Date Owning Pharmacy    acetaminophen (TYLENOL) 325 MG tablet  1 Bottle 0 12/11/2020    [facility] Discharge - [address]    Sig: Take 2 tablets (650 mg) by mouth every 4 hours as needed for pain Don't take other medications containing acetaminophen    Class: E-Prescribe    Route: Oral    loratadine (CLARITIN) 10 MG tablet            Sig: daily as needed for allergies     Class: Historical    oxyCODONE (ROXICODONE) 5 MG tablet  18 tablet 0 12/11/2020    [facility] - [address]    Sig: Take 1 tablet (5 mg) by mouth every 4 hours as needed for moderate to severe pain    Class: E-Prescribe    Earliest Fill Date: 12/11/2020    Route: Oral    senna-docusate (SENOKOT-S/PERICOLACE) 8.6-50 MG tablet  60 tablet 0 12/11/2020    [facility]  Discharge - [place], [place] - [address] [place] [address]    Sig: Take 1-2 tablets by mouth 2 times daily    Class: E-Prescribe    Route: Oral          Exam:      Appearance: in no apparent distress.   Skin: normal  Head and Neck: Normal, no rashes or jaundice  Respiratory: normal respiratory excursions, no audible wheeze  Cardiovascular: RRR  Abdomen: flat, No distention and Surgical scars consistent with history.  Incisions C/D/I   Extremeties: Edema, none  Neuro: without deficit       Labs:   Admission on 12/10/2020, Discharged on 12/11/2020   Component Date Value Ref Range Status     Glucose 12/10/2020 120* 70 - 99 mg/dL Final    Dr/RN Notified     Miscellaneous Test 12/10/2020      Final                    Value:Specimen Received, Reordered and sent to Performing laboratory - Report to follow upon   completion.       Hemoglobin 12/10/2020 12.8* 13.3 - 17.7 g/dL Final     Hematocrit 12/10/2020 37.0* 40.0 - 53.0 % Final     CK Total 12/10/2020 191  30 - 300 U/L Final     Hemoglobin 12/11/2020 11.4* 13.3 - 17.7 g/dL Final     Hematocrit 12/11/2020 34.4* 40.0 - 53.0 % Final     Urea Nitrogen 12/11/2020 16  7 - 30 mg/dL Final     Creatinine 12/11/2020 1.74* 0.66 - 1.25 mg/dL Final     GFR Estimate 12/11/2020 54* >60 mL/min/[1.73_m2] Final    Comment: Non  GFR Calc  Starting 12/18/2018, serum creatinine based estimated GFR (eGFR) will be   calculated using the Chronic Kidney Disease Epidemiology Collaboration   (CKD-EPI) equation.       GFR Estimate If Black 12/11/2020 62  >60 mL/min/[1.73_m2] Final    Comment:  GFR Calc  Starting 12/18/2018, serum creatinine based estimated GFR (eGFR) will be   calculated using the Chronic Kidney Disease Epidemiology Collaboration   (CKD-EPI) equation.       CK Total 12/11/2020 395* 30 - 300 U/L Final

## 2021-01-07 NOTE — TELEPHONE ENCOUNTER
I called the patient to check on his recovery process.  He reports doing well.  He has increased his exercise routine just a bit to incorporate 5 pound weight.  He started that yesterday.  I reviewed that there is 2 more weeks of the 10 pound lifting restriction and that it is good he is phasing things in slowly.    He thanked me for the donor blanket gift.  He has no complaints as he has no pain and the wound is healing.  We made a plan for the lab tests that are needed at 6 weeks, he would like to come to [facility].  I will make appointment for Jan 21 at 08:00 and place orders.  I wished him well with his recovery.  He stated he has gotten several notifications in his mychart that there are bills due.  I will send word to the donor billing persons.

## 2021-10-10 ENCOUNTER — HEALTH MAINTENANCE LETTER (OUTPATIENT)
Age: 26
End: 2021-10-10

## 2021-11-12 DIAGNOSIS — Z52.4 KIDNEY DONOR: Primary | ICD-10-CM

## 2021-12-01 ENCOUNTER — LAB (OUTPATIENT)
Dept: LAB | Facility: CLINIC | Age: 26
End: 2021-12-01
Payer: COMMERCIAL

## 2021-12-01 DIAGNOSIS — Z52.4 KIDNEY DONOR: ICD-10-CM

## 2021-12-01 LAB
ALBUMIN UR-MCNC: NEGATIVE MG/DL
APPEARANCE UR: CLEAR
BILIRUB UR QL STRIP: NEGATIVE
COLOR UR AUTO: YELLOW
CREAT UR-MCNC: 84 MG/DL
GLUCOSE UR STRIP-MCNC: NEGATIVE MG/DL
HGB UR QL STRIP: NEGATIVE
KETONES UR STRIP-MCNC: NEGATIVE MG/DL
LEUKOCYTE ESTERASE UR QL STRIP: NEGATIVE
NITRATE UR QL: NEGATIVE
PH UR STRIP: 6 [PH] (ref 5–7)
PROT UR-MCNC: 0.07 G/L
PROT/CREAT 24H UR: 0.08 G/G CR (ref 0–0.2)
SP GR UR STRIP: 1.02 (ref 1–1.03)
UROBILINOGEN UR STRIP-ACNC: 0.2 E.U./DL

## 2021-12-01 PROCEDURE — 82565 ASSAY OF CREATININE: CPT

## 2021-12-01 PROCEDURE — 36415 COLL VENOUS BLD VENIPUNCTURE: CPT

## 2021-12-01 PROCEDURE — 82043 UR ALBUMIN QUANTITATIVE: CPT

## 2021-12-01 PROCEDURE — 81003 URINALYSIS AUTO W/O SCOPE: CPT

## 2021-12-01 PROCEDURE — 84156 ASSAY OF PROTEIN URINE: CPT

## 2021-12-02 LAB
CREAT SERPL-MCNC: 1.37 MG/DL (ref 0.66–1.25)
CREAT UR-MCNC: 80 MG/DL
GFR SERPL CREATININE-BSD FRML MDRD: 72 ML/MIN/1.73M2
MICROALBUMIN UR-MCNC: <5 MG/L
MICROALBUMIN/CREAT UR: NORMAL MG/G{CREAT}

## 2022-01-17 DIAGNOSIS — Z52.4 KIDNEY DONOR: Primary | ICD-10-CM

## 2022-01-30 ENCOUNTER — HEALTH MAINTENANCE LETTER (OUTPATIENT)
Age: 27
End: 2022-01-30

## 2022-09-24 ENCOUNTER — HEALTH MAINTENANCE LETTER (OUTPATIENT)
Age: 27
End: 2022-09-24

## 2022-12-12 ENCOUNTER — DOCUMENTATION ONLY (OUTPATIENT)
Dept: TRANSPLANT | Facility: CLINIC | Age: 27
End: 2022-12-12

## 2022-12-12 DIAGNOSIS — Z52.4 KIDNEY DONOR: Primary | ICD-10-CM

## 2023-01-03 ENCOUNTER — LAB (OUTPATIENT)
Dept: LAB | Facility: CLINIC | Age: 28
End: 2023-01-03

## 2023-01-03 DIAGNOSIS — Z52.4 KIDNEY DONOR: ICD-10-CM

## 2023-01-03 LAB
ALBUMIN MFR UR ELPH: 10.7 MG/DL
ALBUMIN UR-MCNC: NEGATIVE MG/DL
APPEARANCE UR: CLEAR
BILIRUB UR QL STRIP: NEGATIVE
COLOR UR AUTO: NORMAL
CREAT SERPL-MCNC: 1.57 MG/DL (ref 0.67–1.17)
CREAT UR-MCNC: 284 MG/DL
CREAT UR-MCNC: 288 MG/DL
GFR SERPL CREATININE-BSD FRML MDRD: 62 ML/MIN/1.73M2
GLUCOSE UR STRIP-MCNC: NEGATIVE MG/DL
HGB UR QL STRIP: NEGATIVE
KETONES UR STRIP-MCNC: NEGATIVE MG/DL
LEUKOCYTE ESTERASE UR QL STRIP: NEGATIVE
MICROALBUMIN UR-MCNC: <12 MG/L
MICROALBUMIN/CREAT UR: NORMAL MG/G{CREAT}
NITRATE UR QL: NEGATIVE
PH UR STRIP: 6 [PH] (ref 5–7)
PROT/CREAT 24H UR: 0.04 MG/MG CR (ref 0–0.2)
SP GR UR STRIP: 1.03 (ref 1–1.03)
UROBILINOGEN UR STRIP-MCNC: 2 MG/DL

## 2023-01-03 PROCEDURE — 84156 ASSAY OF PROTEIN URINE: CPT | Performed by: PATHOLOGY

## 2023-01-03 PROCEDURE — 82043 UR ALBUMIN QUANTITATIVE: CPT | Mod: 90 | Performed by: PATHOLOGY

## 2023-01-03 PROCEDURE — 36415 COLL VENOUS BLD VENIPUNCTURE: CPT | Performed by: PATHOLOGY

## 2023-01-03 PROCEDURE — 81003 URINALYSIS AUTO W/O SCOPE: CPT | Performed by: PATHOLOGY

## 2023-01-03 PROCEDURE — 82565 ASSAY OF CREATININE: CPT | Performed by: PATHOLOGY

## 2023-01-03 PROCEDURE — 82570 ASSAY OF URINE CREATININE: CPT | Mod: 90 | Performed by: PATHOLOGY

## 2023-01-03 PROCEDURE — 99000 SPECIMEN HANDLING OFFICE-LAB: CPT | Performed by: PATHOLOGY

## 2023-01-24 ENCOUNTER — TELEPHONE (OUTPATIENT)
Dept: TRANSPLANT | Facility: CLINIC | Age: 28
End: 2023-01-24

## 2023-01-24 NOTE — TELEPHONE ENCOUNTER
Patient Call: General  Route to LPN    Reason for call: called in regards of having post labs done recently. Patient received a bill and mentioned that this was the first time receiving one. They wanted to figure out why they receiving a bill for these tests. Call back number is [phone].      Call back needed? Yes    Return Call Needed  Same as documented in contacts section  When to return call?: Same day: Route High Priority

## 2023-05-08 ENCOUNTER — HEALTH MAINTENANCE LETTER (OUTPATIENT)
Age: 28
End: 2023-05-08

## 2023-10-26 ENCOUNTER — DOCUMENTATION ONLY (OUTPATIENT)
Dept: TRANSPLANT | Facility: CLINIC | Age: 28
End: 2023-10-26

## 2023-10-26 NOTE — PROGRESS NOTES
I sent [email or web address]. message to the patient regarding 2 year follow up labs post donation.  I reviewed the results of the labs done were stable.  I reviewed his normal range values.  I gave the patient instruction to have a good relationship with PCP and have labs done for good health maintenance.    I told the patient that follow up care is now done but our Research group will be sending the long term Donor Follow up study packets.  I wished the patient well and encouraged the patient to contact us if there should ever be an issue that pertains to the kidney donation.  I have marked the Transplant Episode Status as Not Followed and Followup Completed.  Rochelle Long RN  Living Donor Coordinator  10/26/2023 4:12 PM

## 2024-07-14 ENCOUNTER — HEALTH MAINTENANCE LETTER (OUTPATIENT)
Age: 29
End: 2024-07-14

## 2025-07-19 ENCOUNTER — HEALTH MAINTENANCE LETTER (OUTPATIENT)
Age: 30
End: 2025-07-19

## (undated) DEVICE — GOWN IMPERVIOUS BREATHABLE SMART XLG 89045

## (undated) DEVICE — ENDO SCOPE WARMER SEAL  C3101

## (undated) DEVICE — WIPES FOLEY CARE SURESTEP PROVON DFC100

## (undated) DEVICE — SOL WATER IRRIG 1000ML BOTTLE 2F7114

## (undated) DEVICE — ESU PENCIL SMOKE EVAC W/ROCKER SWITCH 0703-047-000

## (undated) DEVICE — LINEN TOWEL PACK X30 5481

## (undated) DEVICE — CATH TRAY FOLEY SURESTEP 16FR W/URNE MTR STLK LATEX A303316A

## (undated) DEVICE — ENDO TROCAR FIRST ENTRY KII FIOS Z-THRD 05X100MM CTF03

## (undated) DEVICE — DRAPE SLUSH/WARMER 66X44" ORS-320

## (undated) DEVICE — DRAPE IOBAN INCISE 23X17" 6650EZ

## (undated) DEVICE — BNDG ABDOMINAL BINDER 9X45-62" 79-89071

## (undated) DEVICE — ADH SKIN CLOSURE PREMIERPRO EXOFIN 1.0ML 3470

## (undated) DEVICE — SU SILK 3-0 TIE 12X30" A304H

## (undated) DEVICE — LINEN TOWEL PACK X6 WHITE 5487

## (undated) DEVICE — CLIP ENDO HEMO-LOC GREEN MED/LG 544230

## (undated) DEVICE — NDL BLUNT 18GA 1" W/O FILTER 305181

## (undated) DEVICE — SU VICRYL 3-0 SH 27" UND J416H

## (undated) DEVICE — DEVICE SUTURE GRASPER TROCAR CLOSURE 14GA PMITCSG

## (undated) DEVICE — DECANTER BAG 2002S

## (undated) DEVICE — STPL POWERED ECHELON VASC 35MM PVE35A

## (undated) DEVICE — ENDO TROCAR SLEEVE KII Z-THREADED 12X100MM CTS22

## (undated) DEVICE — SYR 30ML LL W/O NDL 302832

## (undated) DEVICE — ESU ENDO SCISSORS 5MM CVD 5DCS

## (undated) DEVICE — Device

## (undated) DEVICE — CLIP ENDO HEMO-LOC PURPLE LG 544240

## (undated) DEVICE — ESU HARMONIC LAPAROSCOPIC SHEARS HD 1000I 36CM HARHD36

## (undated) DEVICE — DEVICE SUTURE PASSER 14GA WECK EFX EFXSP2

## (undated) DEVICE — SYR 10ML SLIP TIP W/O NDL 303134

## (undated) DEVICE — BLADE CLIPPER SGL USE 9680

## (undated) DEVICE — SPONGE LAP 12X12" X8425

## (undated) DEVICE — SU SILK 4-0 TIE 12X30" A303H

## (undated) DEVICE — SU PROLENE 1 CTX 30" 8455H

## (undated) DEVICE — ANTIFOG SOLUTION W/FOAM PAD 31142527

## (undated) DEVICE — SOL NACL 0.9% INJ 1000ML BAG 2B1324X

## (undated) DEVICE — SU VICRYL 0 TIE 54" J608H

## (undated) DEVICE — TUBING IRRIG CYSTO/BLADDER SET 81" LF 2C4040

## (undated) DEVICE — CANNULA VESSEL DLP  30001

## (undated) DEVICE — GLOVE PROTEXIS BLUE W/NEU-THERA 6.5  2D73EB65

## (undated) DEVICE — TUBING INSUFFLATION W/FILTER CPC TO LUER 620-030-301

## (undated) DEVICE — ENDO TROCAR FIRST ENTRY KII FIOS Z-THRD 12X100MM CTF73

## (undated) DEVICE — PREP CHLORAPREP 26ML TINTED ORANGE  260815

## (undated) DEVICE — SU PDS II 0 TP-1 60" Z991G

## (undated) DEVICE — SU MONOCRYL 4-0 PS-2 27" UND Y426H

## (undated) DEVICE — DRAPE ISOLATION BAG 1003

## (undated) RX ORDER — PROTAMINE SULFATE 10 MG/ML
INJECTION, SOLUTION INTRAVENOUS
Status: DISPENSED
Start: 2020-12-10

## (undated) RX ORDER — ACETAMINOPHEN 325 MG/1
TABLET ORAL
Status: DISPENSED
Start: 2020-12-10

## (undated) RX ORDER — FENTANYL CITRATE 50 UG/ML
INJECTION, SOLUTION INTRAMUSCULAR; INTRAVENOUS
Status: DISPENSED
Start: 2020-12-10

## (undated) RX ORDER — HYDROMORPHONE HYDROCHLORIDE 1 MG/ML
INJECTION, SOLUTION INTRAMUSCULAR; INTRAVENOUS; SUBCUTANEOUS
Status: DISPENSED
Start: 2020-12-10

## (undated) RX ORDER — BUPIVACAINE HYDROCHLORIDE 2.5 MG/ML
INJECTION, SOLUTION EPIDURAL; INFILTRATION; INTRACAUDAL
Status: DISPENSED
Start: 2020-12-10

## (undated) RX ORDER — GABAPENTIN 300 MG/1
CAPSULE ORAL
Status: DISPENSED
Start: 2020-12-10

## (undated) RX ORDER — FENTANYL CITRATE-0.9 % NACL/PF 10 MCG/ML
PLASTIC BAG, INJECTION (ML) INTRAVENOUS
Status: DISPENSED
Start: 2020-12-10

## (undated) RX ORDER — HEPARIN SODIUM 1000 [USP'U]/ML
INJECTION, SOLUTION INTRAVENOUS; SUBCUTANEOUS
Status: DISPENSED
Start: 2020-12-10

## (undated) RX ORDER — CARDIOPLEG/ORGAN PRESERV NO.1 9-198-2-1
BOTTLE PERFUSION
Status: DISPENSED
Start: 2020-12-10

## (undated) RX ORDER — HEPARIN SODIUM 5000 [USP'U]/.5ML
INJECTION, SOLUTION INTRAVENOUS; SUBCUTANEOUS
Status: DISPENSED
Start: 2020-12-10

## (undated) RX ORDER — CEFAZOLIN SODIUM 1 G/3ML
INJECTION, POWDER, FOR SOLUTION INTRAMUSCULAR; INTRAVENOUS
Status: DISPENSED
Start: 2020-12-10

## (undated) RX ORDER — ONDANSETRON 2 MG/ML
INJECTION INTRAMUSCULAR; INTRAVENOUS
Status: DISPENSED
Start: 2020-12-10

## (undated) RX ORDER — SODIUM CHLORIDE, SODIUM LACTATE, POTASSIUM CHLORIDE, CALCIUM CHLORIDE 600; 310; 30; 20 MG/100ML; MG/100ML; MG/100ML; MG/100ML
INJECTION, SOLUTION INTRAVENOUS
Status: DISPENSED
Start: 2020-12-10

## (undated) RX ORDER — DEXAMETHASONE SODIUM PHOSPHATE 4 MG/ML
INJECTION, SOLUTION INTRA-ARTICULAR; INTRALESIONAL; INTRAMUSCULAR; INTRAVENOUS; SOFT TISSUE
Status: DISPENSED
Start: 2020-12-10

## (undated) RX ORDER — CEFUROXIME SODIUM 1.5 G/16ML
INJECTION, POWDER, FOR SOLUTION INTRAVENOUS
Status: DISPENSED
Start: 2020-12-10

## (undated) RX ORDER — PROPOFOL 10 MG/ML
INJECTION, EMULSION INTRAVENOUS
Status: DISPENSED
Start: 2020-12-10